# Patient Record
Sex: FEMALE | Race: OTHER | Employment: UNEMPLOYED | URBAN - METROPOLITAN AREA
[De-identification: names, ages, dates, MRNs, and addresses within clinical notes are randomized per-mention and may not be internally consistent; named-entity substitution may affect disease eponyms.]

---

## 2019-06-16 ENCOUNTER — HOSPITAL ENCOUNTER (INPATIENT)
Age: 74
LOS: 3 days | Discharge: HOME | DRG: 871 | End: 2019-06-19
Attending: EMERGENCY MEDICINE | Admitting: INTERNAL MEDICINE
Payer: SELF-PAY

## 2019-06-16 ENCOUNTER — APPOINTMENT (OUTPATIENT)
Dept: NON INVASIVE DIAGNOSTICS | Age: 74
DRG: 871 | End: 2019-06-16
Attending: INTERNAL MEDICINE
Payer: SELF-PAY

## 2019-06-16 ENCOUNTER — APPOINTMENT (OUTPATIENT)
Dept: GENERAL RADIOLOGY | Age: 74
DRG: 871 | End: 2019-06-16
Attending: EMERGENCY MEDICINE
Payer: SELF-PAY

## 2019-06-16 ENCOUNTER — APPOINTMENT (OUTPATIENT)
Dept: CT IMAGING | Age: 74
DRG: 871 | End: 2019-06-16
Attending: EMERGENCY MEDICINE
Payer: SELF-PAY

## 2019-06-16 DIAGNOSIS — R00.0 TACHYCARDIA: ICD-10-CM

## 2019-06-16 DIAGNOSIS — R06.02 SOB (SHORTNESS OF BREATH): Primary | ICD-10-CM

## 2019-06-16 DIAGNOSIS — R22.1 MASS OF RIGHT SIDE OF NECK: ICD-10-CM

## 2019-06-16 DIAGNOSIS — J18.9 PNEUMONIA OF BOTH UPPER LOBES DUE TO INFECTIOUS ORGANISM: ICD-10-CM

## 2019-06-16 PROBLEM — I10 HTN (HYPERTENSION): Status: ACTIVE | Noted: 2019-06-16

## 2019-06-16 PROBLEM — E87.6 HYPOKALEMIA: Status: ACTIVE | Noted: 2019-06-16

## 2019-06-16 PROBLEM — A41.9 SEPSIS (HCC): Status: ACTIVE | Noted: 2019-06-16

## 2019-06-16 PROBLEM — R93.89 ABNORMAL CHEST X-RAY: Status: ACTIVE | Noted: 2019-06-16

## 2019-06-16 PROBLEM — D72.829 LEUKOCYTOSIS: Status: ACTIVE | Noted: 2019-06-16

## 2019-06-16 LAB
ALBUMIN SERPL-MCNC: 4.4 G/DL (ref 3.5–5)
ALBUMIN/GLOB SERPL: 1 {RATIO} (ref 1.1–2.2)
ALP SERPL-CCNC: 145 U/L (ref 45–117)
ALT SERPL-CCNC: 15 U/L (ref 12–78)
ANION GAP SERPL CALC-SCNC: 7 MMOL/L (ref 5–15)
APPEARANCE UR: CLEAR
AST SERPL-CCNC: 17 U/L (ref 15–37)
ATRIAL RATE: 128 BPM
B PERT DNA SPEC QL NAA+PROBE: NOT DETECTED
BACTERIA URNS QL MICRO: NEGATIVE /HPF
BASOPHILS # BLD: 0 K/UL (ref 0–0.1)
BASOPHILS NFR BLD: 0 % (ref 0–1)
BILIRUB SERPL-MCNC: 0.6 MG/DL (ref 0.2–1)
BILIRUB UR QL: NEGATIVE
BNP SERPL-MCNC: 931 PG/ML
BUN SERPL-MCNC: 13 MG/DL (ref 6–20)
BUN/CREAT SERPL: 19 (ref 12–20)
C PNEUM DNA SPEC QL NAA+PROBE: NOT DETECTED
CALCIUM SERPL-MCNC: 8.8 MG/DL (ref 8.5–10.1)
CALCULATED P AXIS, ECG09: 80 DEGREES
CALCULATED R AXIS, ECG10: 82 DEGREES
CALCULATED T AXIS, ECG11: 8 DEGREES
CHLORIDE SERPL-SCNC: 104 MMOL/L (ref 97–108)
CO2 SERPL-SCNC: 27 MMOL/L (ref 21–32)
COLOR UR: ABNORMAL
COMMENT, HOLDF: NORMAL
CREAT SERPL-MCNC: 0.7 MG/DL (ref 0.55–1.02)
DIAGNOSIS, 93000: NORMAL
DIFFERENTIAL METHOD BLD: ABNORMAL
ECHO AO ASC DIAM: 2.56 CM
ECHO AO ROOT DIAM: 2.73 CM
ECHO IVC SNIFF: 2.05 CM
ECHO LA AREA 4C: 21.5 CM2
ECHO LA MAJOR AXIS: 3.66 CM
ECHO LA TO AORTIC ROOT RATIO: 1.34
ECHO LA VOL 2C: 69.97 ML (ref 22–52)
ECHO LA VOL 4C: 60.58 ML (ref 22–52)
ECHO LA VOL BP: 67.6 ML (ref 22–52)
ECHO LA VOL/BSA BIPLANE: 50.76 ML/M2 (ref 16–28)
ECHO LA VOLUME INDEX A2C: 52.54 ML/M2 (ref 16–28)
ECHO LA VOLUME INDEX A4C: 45.49 ML/M2 (ref 16–28)
ECHO LV E' LATERAL VELOCITY: 7.27 CENTIMETER/SECOND
ECHO LV E' SEPTAL VELOCITY: 7.54 CENTIMETER/SECOND
ECHO LV INTERNAL DIMENSION DIASTOLIC: 3.85 CM (ref 3.9–5.3)
ECHO LV INTERNAL DIMENSION SYSTOLIC: 2.7 CM
ECHO LV IVSD: 0.91 CM (ref 0.6–0.9)
ECHO LV MASS 2D: 102.1 G (ref 67–162)
ECHO LV MASS INDEX 2D: 76.7 G/M2 (ref 43–95)
ECHO LV POSTERIOR WALL DIASTOLIC: 0.76 CM (ref 0.6–0.9)
ECHO LVOT DIAM: 2.01 CM
ECHO MV A VELOCITY: 54.67 CM/S
ECHO MV AREA PHT: 7.4 CM2
ECHO MV E DECELERATION TIME (DT): 102.8 MS
ECHO MV E VELOCITY: 82.42 CM/S
ECHO MV E/A RATIO: 1.51
ECHO MV PRESSURE HALF TIME (PHT): 29.8 MS
ECHO PULMONARY ARTERY SYSTOLIC PRESSURE (PASP): 30 MMHG
ECHO RA AREA 4C: 14.02 CM2
ECHO RV INTERNAL DIMENSION: 3.22 CM
ECHO RV TAPSE: 1.76 CM (ref 1.5–2)
EOSINOPHIL # BLD: 0.3 K/UL (ref 0–0.4)
EOSINOPHIL NFR BLD: 2 % (ref 0–7)
EPITH CASTS URNS QL MICRO: ABNORMAL /LPF
ERYTHROCYTE [DISTWIDTH] IN BLOOD BY AUTOMATED COUNT: 13.8 % (ref 11.5–14.5)
FLUAV H1 2009 PAND RNA SPEC QL NAA+PROBE: NOT DETECTED
FLUAV H1 RNA SPEC QL NAA+PROBE: NOT DETECTED
FLUAV H3 RNA SPEC QL NAA+PROBE: NOT DETECTED
FLUAV SUBTYP SPEC NAA+PROBE: NOT DETECTED
FLUBV RNA SPEC QL NAA+PROBE: NOT DETECTED
GLOBULIN SER CALC-MCNC: 4.2 G/DL (ref 2–4)
GLUCOSE SERPL-MCNC: 120 MG/DL (ref 65–100)
GLUCOSE UR STRIP.AUTO-MCNC: NEGATIVE MG/DL
HADV DNA SPEC QL NAA+PROBE: NOT DETECTED
HCOV 229E RNA SPEC QL NAA+PROBE: NOT DETECTED
HCOV HKU1 RNA SPEC QL NAA+PROBE: NOT DETECTED
HCOV NL63 RNA SPEC QL NAA+PROBE: NOT DETECTED
HCOV OC43 RNA SPEC QL NAA+PROBE: NOT DETECTED
HCT VFR BLD AUTO: 43.2 % (ref 35–47)
HGB BLD-MCNC: 14.1 G/DL (ref 11.5–16)
HGB UR QL STRIP: ABNORMAL
HMPV RNA SPEC QL NAA+PROBE: NOT DETECTED
HPIV1 RNA SPEC QL NAA+PROBE: NOT DETECTED
HPIV2 RNA SPEC QL NAA+PROBE: NOT DETECTED
HPIV3 RNA SPEC QL NAA+PROBE: NOT DETECTED
HPIV4 RNA SPEC QL NAA+PROBE: NOT DETECTED
IMM GRANULOCYTES # BLD AUTO: 0.1 K/UL (ref 0–0.04)
IMM GRANULOCYTES NFR BLD AUTO: 0 % (ref 0–0.5)
KETONES UR QL STRIP.AUTO: NEGATIVE MG/DL
LACTATE BLD-SCNC: 0.91 MMOL/L (ref 0.4–2)
LEUKOCYTE ESTERASE UR QL STRIP.AUTO: ABNORMAL
LYMPHOCYTES # BLD: 2 K/UL (ref 0.8–3.5)
LYMPHOCYTES NFR BLD: 17 % (ref 12–49)
M PNEUMO DNA SPEC QL NAA+PROBE: NOT DETECTED
MAGNESIUM SERPL-MCNC: 2.5 MG/DL (ref 1.6–2.4)
MCH RBC QN AUTO: 28.7 PG (ref 26–34)
MCHC RBC AUTO-ENTMCNC: 32.6 G/DL (ref 30–36.5)
MCV RBC AUTO: 88 FL (ref 80–99)
MONOCYTES # BLD: 0.8 K/UL (ref 0–1)
MONOCYTES NFR BLD: 7 % (ref 5–13)
NEUTS SEG # BLD: 9 K/UL (ref 1.8–8)
NEUTS SEG NFR BLD: 74 % (ref 32–75)
NITRITE UR QL STRIP.AUTO: NEGATIVE
NRBC # BLD: 0 K/UL (ref 0–0.01)
NRBC BLD-RTO: 0 PER 100 WBC
P-R INTERVAL, ECG05: 132 MS
PH UR STRIP: 7 [PH] (ref 5–8)
PLATELET # BLD AUTO: 188 K/UL (ref 150–400)
PMV BLD AUTO: 12 FL (ref 8.9–12.9)
POTASSIUM SERPL-SCNC: 3.2 MMOL/L (ref 3.5–5.1)
PROCALCITONIN SERPL-MCNC: <0.1 NG/ML
PROT SERPL-MCNC: 8.6 G/DL (ref 6.4–8.2)
PROT UR STRIP-MCNC: NEGATIVE MG/DL
Q-T INTERVAL, ECG07: 286 MS
QRS DURATION, ECG06: 66 MS
QTC CALCULATION (BEZET), ECG08: 417 MS
RBC # BLD AUTO: 4.91 M/UL (ref 3.8–5.2)
RBC #/AREA URNS HPF: ABNORMAL /HPF (ref 0–5)
RSV RNA SPEC QL NAA+PROBE: NOT DETECTED
RV+EV RNA SPEC QL NAA+PROBE: DETECTED
SAMPLES BEING HELD,HOLD: NORMAL
SODIUM SERPL-SCNC: 138 MMOL/L (ref 136–145)
SP GR UR REFRACTOMETRY: 1.01 (ref 1–1.03)
T4 FREE SERPL-MCNC: 1.2 NG/DL (ref 0.8–1.5)
TROPONIN I BLD-MCNC: <0.04 NG/ML (ref 0–0.08)
TROPONIN I BLD-MCNC: <0.04 NG/ML (ref 0–0.08)
TSH SERPL DL<=0.05 MIU/L-ACNC: 0.98 UIU/ML (ref 0.36–3.74)
UR CULT HOLD, URHOLD: NORMAL
UROBILINOGEN UR QL STRIP.AUTO: 0.2 EU/DL (ref 0.2–1)
VENTRICULAR RATE, ECG03: 128 BPM
WBC # BLD AUTO: 12.2 K/UL (ref 3.6–11)
WBC URNS QL MICRO: ABNORMAL /HPF (ref 0–4)

## 2019-06-16 PROCEDURE — 93306 TTE W/DOPPLER COMPLETE: CPT

## 2019-06-16 PROCEDURE — 96375 TX/PRO/DX INJ NEW DRUG ADDON: CPT

## 2019-06-16 PROCEDURE — 99285 EMERGENCY DEPT VISIT HI MDM: CPT

## 2019-06-16 PROCEDURE — 77030013140 HC MSK NEB VYRM -A

## 2019-06-16 PROCEDURE — 71046 X-RAY EXAM CHEST 2 VIEWS: CPT

## 2019-06-16 PROCEDURE — 80053 COMPREHEN METABOLIC PANEL: CPT

## 2019-06-16 PROCEDURE — 86580 TB INTRADERMAL TEST: CPT | Performed by: INTERNAL MEDICINE

## 2019-06-16 PROCEDURE — 74011250636 HC RX REV CODE- 250/636: Performed by: EMERGENCY MEDICINE

## 2019-06-16 PROCEDURE — 83605 ASSAY OF LACTIC ACID: CPT

## 2019-06-16 PROCEDURE — 84484 ASSAY OF TROPONIN QUANT: CPT

## 2019-06-16 PROCEDURE — 81001 URINALYSIS AUTO W/SCOPE: CPT

## 2019-06-16 PROCEDURE — 74011636320 HC RX REV CODE- 636/320: Performed by: RADIOLOGY

## 2019-06-16 PROCEDURE — 74011250637 HC RX REV CODE- 250/637: Performed by: INTERNAL MEDICINE

## 2019-06-16 PROCEDURE — 87449 NOS EACH ORGANISM AG IA: CPT

## 2019-06-16 PROCEDURE — 84439 ASSAY OF FREE THYROXINE: CPT

## 2019-06-16 PROCEDURE — 65270000029 HC RM PRIVATE

## 2019-06-16 PROCEDURE — 74011000250 HC RX REV CODE- 250

## 2019-06-16 PROCEDURE — 74011250636 HC RX REV CODE- 250/636: Performed by: INTERNAL MEDICINE

## 2019-06-16 PROCEDURE — 94762 N-INVAS EAR/PLS OXIMTRY CONT: CPT

## 2019-06-16 PROCEDURE — 83735 ASSAY OF MAGNESIUM: CPT

## 2019-06-16 PROCEDURE — 74011000302 HC RX REV CODE- 302: Performed by: INTERNAL MEDICINE

## 2019-06-16 PROCEDURE — 94760 N-INVAS EAR/PLS OXIMETRY 1: CPT

## 2019-06-16 PROCEDURE — 87633 RESP VIRUS 12-25 TARGETS: CPT

## 2019-06-16 PROCEDURE — 87040 BLOOD CULTURE FOR BACTERIA: CPT

## 2019-06-16 PROCEDURE — 85025 COMPLETE CBC W/AUTO DIFF WBC: CPT

## 2019-06-16 PROCEDURE — 93005 ELECTROCARDIOGRAM TRACING: CPT

## 2019-06-16 PROCEDURE — 87899 AGENT NOS ASSAY W/OPTIC: CPT

## 2019-06-16 PROCEDURE — 84145 PROCALCITONIN (PCT): CPT

## 2019-06-16 PROCEDURE — 96365 THER/PROPH/DIAG IV INF INIT: CPT

## 2019-06-16 PROCEDURE — 87116 MYCOBACTERIA CULTURE: CPT

## 2019-06-16 PROCEDURE — 36415 COLL VENOUS BLD VENIPUNCTURE: CPT

## 2019-06-16 PROCEDURE — 83880 ASSAY OF NATRIURETIC PEPTIDE: CPT

## 2019-06-16 PROCEDURE — 70491 CT SOFT TISSUE NECK W/DYE: CPT

## 2019-06-16 PROCEDURE — 74011000250 HC RX REV CODE- 250: Performed by: EMERGENCY MEDICINE

## 2019-06-16 PROCEDURE — 84443 ASSAY THYROID STIM HORMONE: CPT

## 2019-06-16 RX ORDER — SODIUM CHLORIDE 0.9 % (FLUSH) 0.9 %
5-40 SYRINGE (ML) INJECTION EVERY 8 HOURS
Status: DISCONTINUED | OUTPATIENT
Start: 2019-06-16 | End: 2019-06-19 | Stop reason: HOSPADM

## 2019-06-16 RX ORDER — DIPHENHYDRAMINE HCL 25 MG
25 CAPSULE ORAL
Status: DISCONTINUED | OUTPATIENT
Start: 2019-06-16 | End: 2019-06-19 | Stop reason: HOSPADM

## 2019-06-16 RX ORDER — SODIUM CHLORIDE 0.9 % (FLUSH) 0.9 %
5-40 SYRINGE (ML) INJECTION AS NEEDED
Status: DISCONTINUED | OUTPATIENT
Start: 2019-06-16 | End: 2019-06-19 | Stop reason: HOSPADM

## 2019-06-16 RX ORDER — METOPROLOL TARTRATE 25 MG/1
25 TABLET, FILM COATED ORAL EVERY 12 HOURS
Status: DISCONTINUED | OUTPATIENT
Start: 2019-06-16 | End: 2019-06-19 | Stop reason: HOSPADM

## 2019-06-16 RX ORDER — POTASSIUM CHLORIDE 7.45 MG/ML
10 INJECTION INTRAVENOUS
Status: COMPLETED | OUTPATIENT
Start: 2019-06-16 | End: 2019-06-16

## 2019-06-16 RX ORDER — ENOXAPARIN SODIUM 100 MG/ML
30 INJECTION SUBCUTANEOUS EVERY 24 HOURS
Status: DISCONTINUED | OUTPATIENT
Start: 2019-06-16 | End: 2019-06-19 | Stop reason: HOSPADM

## 2019-06-16 RX ORDER — ONDANSETRON 2 MG/ML
4 INJECTION INTRAMUSCULAR; INTRAVENOUS
Status: DISCONTINUED | OUTPATIENT
Start: 2019-06-16 | End: 2019-06-19 | Stop reason: HOSPADM

## 2019-06-16 RX ORDER — ZOLPIDEM TARTRATE 5 MG/1
5 TABLET ORAL
Status: DISCONTINUED | OUTPATIENT
Start: 2019-06-16 | End: 2019-06-19 | Stop reason: HOSPADM

## 2019-06-16 RX ORDER — IPRATROPIUM BROMIDE AND ALBUTEROL SULFATE 2.5; .5 MG/3ML; MG/3ML
SOLUTION RESPIRATORY (INHALATION)
Status: COMPLETED
Start: 2019-06-16 | End: 2019-06-16

## 2019-06-16 RX ORDER — ACETAMINOPHEN 325 MG/1
650 TABLET ORAL
Status: DISCONTINUED | OUTPATIENT
Start: 2019-06-16 | End: 2019-06-19 | Stop reason: HOSPADM

## 2019-06-16 RX ADMIN — Medication 10 ML: at 13:28

## 2019-06-16 RX ADMIN — Medication 10 ML: at 10:18

## 2019-06-16 RX ADMIN — WATER 2 G: 1 INJECTION INTRAMUSCULAR; INTRAVENOUS; SUBCUTANEOUS at 06:50

## 2019-06-16 RX ADMIN — ACETAMINOPHEN 650 MG: 325 TABLET ORAL at 10:56

## 2019-06-16 RX ADMIN — IOPAMIDOL 100 ML: 612 INJECTION, SOLUTION INTRAVENOUS at 07:15

## 2019-06-16 RX ADMIN — TUBERCULIN PURIFIED PROTEIN DERIVATIVE 5 UNITS: 5 INJECTION, SOLUTION INTRADERMAL at 08:49

## 2019-06-16 RX ADMIN — POTASSIUM CHLORIDE 10 MEQ: 10 INJECTION, SOLUTION INTRAVENOUS at 10:57

## 2019-06-16 RX ADMIN — AZITHROMYCIN MONOHYDRATE 500 MG: 500 INJECTION, POWDER, LYOPHILIZED, FOR SOLUTION INTRAVENOUS at 06:52

## 2019-06-16 RX ADMIN — METOPROLOL TARTRATE 25 MG: 25 TABLET ORAL at 09:19

## 2019-06-16 RX ADMIN — ENOXAPARIN SODIUM 30 MG: 30 INJECTION SUBCUTANEOUS at 13:28

## 2019-06-16 RX ADMIN — Medication 5 ML: at 06:54

## 2019-06-16 RX ADMIN — IPRATROPIUM BROMIDE AND ALBUTEROL SULFATE 3 ML: .5; 3 SOLUTION RESPIRATORY (INHALATION) at 06:44

## 2019-06-16 RX ADMIN — ACETAMINOPHEN 650 MG: 325 TABLET ORAL at 17:47

## 2019-06-16 RX ADMIN — ALBUTEROL SULFATE 1 DOSE: 2.5 SOLUTION RESPIRATORY (INHALATION) at 06:48

## 2019-06-16 RX ADMIN — POTASSIUM CHLORIDE 10 MEQ: 10 INJECTION, SOLUTION INTRAVENOUS at 13:27

## 2019-06-16 RX ADMIN — Medication 10 ML: at 20:23

## 2019-06-16 RX ADMIN — SODIUM CHLORIDE 1000 ML: 900 INJECTION, SOLUTION INTRAVENOUS at 06:53

## 2019-06-16 RX ADMIN — POTASSIUM CHLORIDE 10 MEQ: 10 INJECTION, SOLUTION INTRAVENOUS at 11:47

## 2019-06-16 RX ADMIN — METOPROLOL TARTRATE 25 MG: 25 TABLET ORAL at 20:17

## 2019-06-16 RX ADMIN — POTASSIUM CHLORIDE 10 MEQ: 10 INJECTION, SOLUTION INTRAVENOUS at 09:19

## 2019-06-16 NOTE — H&P
[facility]    Hospitalist Admission Note      NAME:  Richad Riedel   :   1945   MRN:  [id number]     PCP:  None     Date/Time:  2019 8:48 AM          Subjective:     CHIEF COMPLAINT: cough     HISTORY OF PRESENT ILLNESS:     Ms. Belle Coughlin is a 72 y.o.  female who presented to the Emergency Department complaining of cough. Children in room to translate and provide hx Present for 2 days, with fever at home yesterday. No sick contacts. ER finds SIRS criteria and abnormal chest xray. She has a large chronic neck mass of unclear etiology. We will admit her for management. Past Medical History:   Diagnosis Date    HTN (hypertension)     Neck mass         No past surgical history on file. Social History     Tobacco Use    Smoking status: Not on file   Substance Use Topics    Alcohol use: Not on file        No family history on file.    Family hx cannot be fully assessed, due to her lack of knowledge of parents Dx    No Known Allergies     Prior to Admission medications    Not on File       Review of Systems:  (bold if positive, if negative)    Gen:  feverEyes:  ENT:  CVS:  Pulm: GI:  :  MS:  Skin:  Psych:  Endo:  Hem:  Renal:  Neuro:        Objective:      VITALS:    Vital signs reviewed; most recent are:    Visit Vitals  /86 (BP 1 Location: Right arm, BP Patient Position: Sitting)   Pulse (!) 120   Temp 98.8 °F (37.1 °C)   Resp 20   SpO2 99%     SpO2 Readings from Last 6 Encounters:   19 99%        No intake or output data in the 24 hours ending 19 0848     Exam:     Physical Exam:    Gen:  Frail, in no acute distress  HEENT:  Pink conjunctivae, PERRL, hearing intact to voice, moist mucous membranes  Neck:  Supple, Large R mass, thyroid non-tender  Resp:  No accessory muscle use, clear breath sounds without wheezes rales or rhonchi  Card:  No murmurs, normal S1, S2 without thrills, bruits or peripheral edema  Abd:  Soft, non-tender, non-distended, normoactive bowel sounds are present, no mass  Lymph:  No cervical or inguinal adenopathy  Musc:  No cyanosis or clubbing  Skin:  No rashes or ulcers, skin turgor is good  Neuro:  Cranial nerves are grossly intact, mild motor weakness, follows commands   Psych:  Good insight, oriented to person, place and time, alert     Labs:    Recent Labs     06/16/19 0558   WBC 12.2*   HGB 14.1   HCT 43.2        Recent Labs     06/16/19 0558      K 3.2*      CO2 27   *   BUN 13   CREA 0.70   CA 8.8   MG 2.5*   ALB 4.4   TBILI 0.6   SGOT 17   ALT 15     No results found for: GLUCPOC  No results for input(s): PH, PCO2, PO2, HCO3, FIO2 in the last 72 hours. No results for input(s): INR in the last 72 hours. No lab exists for component: INREXT  All Micro Results     Procedure Component Value Units Date/Time    RESPIRATORY PANEL,PCR,NASOPHARYNGEAL [[id number]]     Order Status:  Sent Specimen:  NASOPHARYNGEAL SWAB     LEGIONELLA PNEUMOPHILA AG, URINE [[id number]]     Order Status:  Sent Specimen:  Urine     S. Johann Charles, UR/CSF [[id number]]     Order Status:  Sent Specimen:  Other     URINE CULTURE HOLD SAMPLE [[id number]] Collected:  06/16/19 0634    Order Status:  Completed Specimen:  Urine from Serum Updated:  06/16/19 0653     Urine culture hold       URINE ON HOLD IN MICROBIOLOGY DEPT FOR 3 DAYS. IF UNPRESERVED URINE IS SUBMITTED, IT CANNOT BE USED FOR ADDITIONAL TESTING AFTER 24 HRS, RECOLLECTION WILL BE REQUIRED. CULTURE, BLOOD [[id number]] Collected:  06/16/19 0558    Order Status:  Completed Specimen:  Blood Updated:  06/16/19 0617          I have reviewed previous records       Assessment and Plan:      Pneumonia / Cough / Abnormal chest x-ray - POA, unclear etiology. Hx of living in [facility].  Check PPD, viral panel, pneumonia serologies. ER gave Ceftriaxone and Azithromycin. Consult pulm. Abx directions after workup.   May need Chest CT, with bilateral apical scaring noted on neck CT. Non smoker      Sepsis / Leukocytosis / Fever / Sinus tachycardia - POA, due to PNA vs other. NOT severe sepsis. Supportive care. Follow Cx. Neck mass - Present for years, unclear etiology. Never had bx. Consult ENT. They may need to do upper scope to see trachea    Hypokalemia - Replete    HTN (hypertension) - Per patient. Not on Meds. Check ECHO. Start metoprolol. Telemetry reviewed:   normal sinus rhythm    Risk of deterioration: high      Total time spent with patient: 79 Minutes I reviewed chart, notes, data and current medications in the medical record. I have examined and treated the patient at bedside during this period.                  Care Plan discussed with: Patient, Family, Nursing Staff, Consultant/Specialist and >50% of time spent in counseling and coordination of care    Discussed:  Care Plan       ___________________________________________________    Attending Physician: Ann Guillory MD

## 2019-06-16 NOTE — PROGRESS NOTES
[facility] Dosing Services: 6/16/2019    Enoxaparin was automatically dose-adjusted per [facility] P&T Committee Protocol, with respect to patient's actual body weight. Pt Weight:   Wt Readings from Last 1 Encounters:   06/16/19 42.5 kg (93 lb 9.6 oz)     Assessment/Plan: Actual body weight 42.5 kg. CrCl 49 mL/min. Enoxaparin changed to 30 mg SC every 24 hours per P&T approved protocol for female patient with actual body weight 35-45 kg. New Regimen Enoxaparin 30 mg SC every 24 hours   Previous Regimen Enoxaparin 40 mg SC every 24 hours   Serum Creatinine Lab Results   Component Value Date/Time    Creatinine 0.70 06/16/2019 05:58 AM         Creatinine Clearance Estimated Creatinine Clearance: 49.3 mL/min (based on SCr of 0.7 mg/dL).    BUN Lab Results   Component Value Date/Time    BUN 13 06/16/2019 05:58 AM         Dann Iniguez, Pharmacist

## 2019-06-16 NOTE — ED TRIAGE NOTES
Patient arrives for complaints of shortness of breath and chest pain     Patient has swelling to her neck that has been present for about 3 years    Patient having difficulty swallowing secretions     Dr. Shelly Looney at bedside for initial eval

## 2019-06-16 NOTE — ED NOTES
Care assumed from Dr. Joellen Leong at 0700. 72 y.o. female who recently immigrated from [facility] about 1 month prior presents with several year history of right sided neck mass that has been gradually enlarging. She presents with increased SOB and cough, no fever. Labs show WBC 12. normal hg and lactate and trop. Trace elevated BNP. Alk phos 145. Normal TSH. UA neg. CXR shows Bilateral upper lobe airspace disease may represent chronic  scarring, although superimposed pneumonia is not excluded:    CT neck with contrast shows large mass with mass effect but not narrowing of the airway. Hospitalist Alec for Admission  8:02 AM    ED Room Number: CV04/36  Patient Name and age: Orlin Paniagua 72 y.o.  female  Working Diagnosis:   1. SOB (shortness of breath)    2. Pneumonia of both upper lobes due to infectious organism ([address]    3. Mass of right side of neck    4. Tachycardia      Readmission: no  Isolation Requirements:  yes possible TB  Recommended Level of Care:  telemetry  Code Status:  full  Other:  Long history of neck mass has opted against surgery in the past due to risk associated, now with cough and SOB since Thursday. CXR shows likely PNA, CT neck shows mass but no impingement in the airway. Tachy with O2 requirement. Dr. Jadon Salas agrees to admit for further care.

## 2019-06-16 NOTE — ED NOTES
PPD test placed to right forearm- midline, mid forearm. This circled in pen at this time. Instructed patient and family regarding need for this testing, they verbalize understanding.

## 2019-06-16 NOTE — ROUTINE PROCESS
TRANSFER - OUT REPORT: 
 
Verbal report given to Shelly RN(name) on Jenn Sen  being transferred to 515(unit) for routine progression of care Report consisted of patients Situation, Background, Assessment and  
Recommendations(SBAR). Information from the following report(s) ED Summary was reviewed with the receiving nurse. Lines:  
Peripheral IV 06/16/19 Left Antecubital (Active) Site Assessment Clean, dry, & intact 6/16/2019  6:01 AM  
Phlebitis Assessment 0 6/16/2019  6:01 AM  
Dressing Status New 6/16/2019  6:01 AM  
Hub Color/Line Status Green 6/16/2019  6:01 AM  
  
 
Opportunity for questions and clarification was provided. Patient transported with: 
 Monitor Registered Nurse

## 2019-06-16 NOTE — ED NOTES
Patient's family calling out that patient cant breathe    Dr Diana Single at bedside to assess patient, orders placed for breathing treatment

## 2019-06-16 NOTE — PROGRESS NOTES
TRANSFER - IN REPORT:    Verbal report received from Celine(name) on Arlyss Priscilla  being received from ED(unit) for routine progression of care      Report consisted of patients Situation, Background, Assessment and   Recommendations(SBAR). Information from the following report(s) SBAR, Kardex, [facility] and Recent Results was reviewed with the receiving nurse. Opportunity for questions and clarification was provided. Assessment completed upon patients arrival to unit and care assumed.

## 2019-06-16 NOTE — PROGRESS NOTES
6/16/2019  3:02 PM    Pt's nurse advised pt's grandson Tone Collins can be contacted at [phone], this is the number CM previously called with no answer. Melina Angelucci, MSW      2:16 PM  CM consult received requesting PCP list, went to visit pt, unable to visit due to precautions, CM called contact number [phone] twice, no answer. CM spoke with pt's nurse and provided PCP list for nurse to provide to pt's family, asked nurse to get contact phone number for a pt's family member to provide to CM to complete an assessment.   Melina Angelucci, MSW

## 2019-06-16 NOTE — CONSULTS
[facility]     Name: Mandi Burton MRN: [id number]   : 1945 Hospital: [address]   Date: 2019        Impression Plan   1. Acute respiratory failure   2. COugh  3. Shortness of breath  4. Cavitary lesion ALEXIS  5. Neck mass               · Continue with azithro/ceftriaxone  · Pt on droplet precautions  · Bronch tomorrow  · RVP  · PNA panel  · AFB sputum cx x3  · ENT consult for neck mass  · NPO at midnight            Radiology  ( personally reviewed) CT neck- left and right upper. Left cavitary lesion- no prior imaging   ABG No results for input(s): PHI, PO2I, PCO2I in the last 72 hours. Subjective     Cc: cough    73 yo with PMHX of HTN presenting with one week of cough productive of clear sputum with some blood specks. Per son, pt has had a cough off and on since arriving to the [facility] April of this year. Pt does not speak English, nor does she seem very interested in the conversations going on around her. Vigorously eating food. Son denies any wt loss/fevers/chills. He does know of any TB exposures, but can't speak with any certainty on this. CT neck showed a 7x6 cm right neck mass along with biapical scarring and a ALEXIS cavitary lesions. Review of Systems:  A comprehensive review of systems was negative except for that written in the HPI. Past Medical History:   Diagnosis Date    HTN (hypertension)     Neck mass       No past surgical history on file.    Prior to Admission medications    Not on File     Current Facility-Administered Medications   Medication Dose Route Frequency    sodium chloride (NS) flush 5-40 mL  5-40 mL IntraVENous Q8H    azithromycin (ZITHROMAX) 500 mg in 0.9% sodium chloride (MBP/ADV) 250 mL  500 mg IntraVENous Q24H    [START ON 2019] cefTRIAXone (ROCEPHIN) 2 g in 0.9% sodium chloride (MBP/ADV) 50 mL  2 g IntraVENous Q24H    metoprolol tartrate (LOPRESSOR) tablet 25 mg  25 mg Oral Q12H    sodium chloride (NS) flush 5-40 mL  5-40 mL IntraVENous Q8H    enoxaparin (LOVENOX) injection 40 mg  40 mg SubCUTAneous Q24H    tuberculin injection 5 Units  5 Units IntraDERMal ONCE    [START ON 6/17/2019] Tuberculin (Mantoux) Test: Read at 24 hrs and 48 hrs post placement. Thank you! 1 Each Other Q24H    potassium chloride 10 mEq in 100 ml IVPB  10 mEq IntraVENous Q1H     No Known Allergies   Social History     Tobacco Use    Smoking status: Not on file   Substance Use Topics    Alcohol use: Not on file      No family history on file. Laboratory: I have personally reviewed the critical care flowsheet and labs.      Recent Labs     06/16/19  0558   WBC 12.2*   HGB 14.1   HCT 43.2        Recent Labs     06/16/19  0558      K 3.2*      CO2 27   *   BUN 13   CREA 0.70   CA 8.8   MG 2.5*   ALB 4.4   SGOT 17   ALT 15       Objective:     Mode Rate Tidal Volume Pressure FiO2 PEEP                    Vital Signs:     TMAX(24)      Intake/Output:   Last shift:         Last 3 shifts: 06/16 0701 - 06/16 1900  In: 1250 [I.V.:1250]  Out: - RRIOLAST3    Intake/Output Summary (Last 24 hours) at 6/16/2019 0947  Last data filed at 6/16/2019 0753  Gross per 24 hour   Intake 1250 ml   Output    Net 1250 ml     EXAM:   GENERAL: well developed and in no distress, HEENT:  PERRL, EOMI, no alar flaring or epistaxis, oral mucosa moist without cyanosis, NECK:  no jugular vein distention, large, firm right sided neck mass LUNGS: CTA, no w/r/r, HEART:  Regular rate and rhythm with no MGR; no edema is present, ABDOMEN:  soft with no tenderness, bowel sounds present, EXTREMITIES:  warm with no cyanosis, SKIN:  no jaundice or ecchymosis and NEUROLOGIC:  alert and oriented, grossly non-focal    Leena Schultz MD  [facility]

## 2019-06-16 NOTE — PROGRESS NOTES
[facility]: MED RECONCILIATION    Comments/Recommendations:   Med rec performed by Cheryl Alan RN. Patient denies any medications which is also consistent with MD notes.        Cindy Noriega, PHARMD   Contact: [phone]

## 2019-06-16 NOTE — PROGRESS NOTES
Bedside shift change report given to Zoraida Waldrop (oncoming nurse) by Marleny Shafer (offgoing nurse). Report included the following information SBAR, Kardex, MAR and Recent Results.

## 2019-06-16 NOTE — ED PROVIDER NOTES
This is a 68-year-old female who comes emergency room with chief complaint of shortness of breath. Patient's family state that she's been having shortness of breath the past couple of days. Patient has had a productive cough. Patient and family were state that her symptoms have continued to worsen. Patient has a history of a right-sided neck mass which is been present for for 5 years. Patient denies any fever. The patient denies any abdominal pain. Patient did have some chest pain associated with her shortness of breath since last night. Patient states that they wanted to operate on her neck mass, however, the patient refused to to the risks of the surgery. Patient is unsure exactly what the neck mass is. Patient recently relocated in the [place] from [facility] about one month ago. The history is provided by the patient and a relative. The history is limited by a language barrier. A  was used (family member). Shortness of Breath   This is a new problem. The current episode started 2 days ago. The problem has been gradually worsening. Associated symptoms include swollen glands, cough and sputum production. Pertinent negatives include no fever, no rhinorrhea, no ear pain, no hemoptysis, no chest pain, no vomiting, no abdominal pain, no rash, no leg pain and no leg swelling. It is unknown what precipitated the problem. She has tried nothing for the symptoms. Associated medical issues do not include asthma, COPD, pneumonia, CAD, heart failure or DVT. No past medical history on file. No past surgical history on file. No family history on file.     Social History     Socioeconomic History    Marital status:      Spouse name: Not on file    Number of children: Not on file    Years of education: Not on file    Highest education level: Not on file   Occupational History    Not on file   Social Needs    Financial resource strain: Not on file    Food insecurity: Worry: Not on file     Inability: Not on file    Transportation needs:     Medical: Not on file     Non-medical: Not on file   Tobacco Use    Smoking status: Not on file   Substance and Sexual Activity    Alcohol use: Not on file    Drug use: Not on file    Sexual activity: Not on file   Lifestyle    Physical activity:     Days per week: Not on file     Minutes per session: Not on file    Stress: Not on file   Relationships    Social connections:     Talks on phone: Not on file     Gets together: Not on file     Attends [employer] service: Not on file     Active member of club or organization: Not on file     Attends meetings of clubs or organizations: Not on file     Relationship status: Not on file    Intimate partner violence:     Fear of current or ex partner: Not on file     Emotionally abused: Not on file     Physically abused: Not on file     Forced sexual activity: Not on file   Other Topics Concern    Not on file   Social History Narrative    Not on file         ALLERGIES: Patient has no known allergies. Review of Systems   Constitutional: Negative for appetite change, chills, fever and unexpected weight change. HENT: Negative for ear pain, hearing loss, rhinorrhea and trouble swallowing. Eyes: Negative for pain and visual disturbance. Respiratory: Positive for cough, sputum production and shortness of breath. Negative for hemoptysis and chest tightness. Cardiovascular: Negative for chest pain, palpitations and leg swelling. Gastrointestinal: Negative for abdominal distention, abdominal pain, blood in stool and vomiting. Genitourinary: Negative for dysuria, hematuria and urgency. Musculoskeletal: Negative for back pain and myalgias. Skin: Negative for rash. Neurological: Negative for dizziness, syncope, weakness and numbness. Psychiatric/Behavioral: Negative for confusion and suicidal ideas. All other systems reviewed and are negative.       Vitals:    06/16/19 0549   BP: 199/86   Pulse: (!) 120   Resp: 20   Temp: 98.8 °F (37.1 °C)   SpO2: 99%            Physical Exam   Constitutional: She is oriented to person, place, and time. She appears well-developed and well-nourished. No distress. HENT:   Head: Normocephalic and atraumatic. Right Ear: External ear normal.   Left Ear: External ear normal.   Nose: Nose normal.   Mouth/Throat: Oropharynx is clear and moist. No oropharyngeal exudate. Eyes: Pupils are equal, round, and reactive to light. Conjunctivae and EOM are normal. Right eye exhibits no discharge. Left eye exhibits no discharge. No scleral icterus. Neck: Normal range of motion. Neck supple. No JVD present. Tracheal deviation (leftward) present. Thyroid mass present. Cardiovascular: Regular rhythm, normal heart sounds and intact distal pulses. Tachycardia present. Exam reveals no gallop and no friction rub. No murmur heard. Pulmonary/Chest: Effort normal. No stridor. Tachypnea noted. No respiratory distress. She has decreased breath sounds in the right lower field and the left lower field. She has no wheezes. She has no rhonchi. She has no rales. She exhibits no tenderness. Abdominal: Soft. Bowel sounds are normal. She exhibits no distension. There is no tenderness. There is no rebound and no guarding. Musculoskeletal: Normal range of motion. She exhibits no edema or tenderness. Neurological: She is alert and oriented to person, place, and time. She has normal strength and normal reflexes. She displays normal reflexes. No cranial nerve deficit or sensory deficit. She exhibits normal muscle tone. Coordination normal. GCS eye subscore is 4. GCS verbal subscore is 5. GCS motor subscore is 6. Skin: Skin is warm and dry. Capillary refill takes less than 2 seconds. No rash noted. She is not diaphoretic. No erythema. No pallor. Psychiatric: She has a normal mood and affect.  Her behavior is normal. Judgment and thought content normal.   Nursing note and vitals reviewed. MDM  Number of Diagnoses or Management Options  Mass of right side of neck:   Pneumonia of both upper lobes due to infectious organism [facility]):   SOB (shortness of breath): Tachycardia:      Amount and/or Complexity of Data Reviewed  Clinical lab tests: ordered and reviewed  Tests in the radiology section of CPT®: ordered and reviewed  Tests in the medicine section of CPT®: ordered and reviewed  Independent visualization of images, tracings, or specimens: yes (ekg)    Risk of Complications, Morbidity, and/or Mortality  Presenting problems: high  Diagnostic procedures: moderate  Management options: moderate    Critical Care  Total time providing critical care: 30-74 minutes (Total critical care time spent exclusive of procedures:  40 minutes)    Patient Progress  Patient progress: stable         Procedures    Chief Complaint   Patient presents with    Shortness of Breath       The patient's presenting problems have been discussed, and they are in agreement with the care plan formulated and outlined with them. I have encouraged them to ask questions as they arise throughout their visit.     MEDICATIONS GIVEN:  Medications   sodium chloride (NS) flush 5-40 mL (5 mL IntraVENous Given 6/16/19 0654)   sodium chloride (NS) flush 5-40 mL (has no administration in time range)   sodium chloride 0.9 % bolus infusion 1,000 mL (1,000 mL IntraVENous New Bag 6/16/19 0653)   iopamidol (ISOVUE 300) 61 % contrast injection 100 mL (has no administration in time range)   azithromycin (ZITHROMAX) 500 mg in 0.9% sodium chloride (MBP/ADV) 250 mL (500 mg IntraVENous New Bag 6/16/19 0652)   cefTRIAXone (ROCEPHIN) 2 g in 0.9% sodium chloride (MBP/ADV) 50 mL (has no administration in time range)   cefTRIAXone (ROCEPHIN) 2 g in sterile water (preservative free) 20 mL IV syringe (2 g IntraVENous Given 6/16/19 0650)   albuterol 5mg / ipratropium 0.5mg neb solution (1 Dose Nebulization Given 6/16/19 0648) albuterol-ipratropium (DUO-NEB) 2.5 mg-0.5 mg/3 ml nebulizer solution (3 mL  Given 6/16/19 0644)       LABS REVIEWED:  Recent Results (from the past 24 hour(s))   EKG, 12 LEAD, INITIAL    Collection Time: 06/16/19  5:46 AM   Result Value Ref Range    Ventricular Rate 128 BPM    Atrial Rate 128 BPM    P-R Interval 132 ms    QRS Duration 66 ms    Q-T Interval 286 ms    QTC Calculation (Bezet) 417 ms    Calculated P Axis 80 degrees    Calculated R Axis 82 degrees    Calculated T Axis 8 degrees    Diagnosis       Sinus tachycardia  Right atrial enlargement  Nonspecific ST abnormality  Abnormal ECG  No previous ECGs available     CBC WITH AUTOMATED DIFF    Collection Time: 06/16/19  5:58 AM   Result Value Ref Range    WBC 12.2 (H) 3.6 - 11.0 K/uL    RBC 4.91 3.80 - 5.20 M/uL    HGB 14.1 11.5 - 16.0 g/dL    HCT 43.2 35.0 - 47.0 %    MCV 88.0 80.0 - 99.0 FL    MCH 28.7 26.0 - 34.0 PG    MCHC 32.6 30.0 - 36.5 g/dL    RDW 13.8 11.5 - 14.5 %    PLATELET 348 987 - 254 K/uL    MPV 12.0 8.9 - 12.9 FL    NRBC 0.0 0  WBC    ABSOLUTE NRBC 0.00 0.00 - 0.01 K/uL    NEUTROPHILS 74 32 - 75 %    LYMPHOCYTES 17 12 - 49 %    MONOCYTES 7 5 - 13 %    EOSINOPHILS 2 0 - 7 %    BASOPHILS 0 0 - 1 %    IMMATURE GRANULOCYTES 0 0.0 - 0.5 %    ABS. NEUTROPHILS 9.0 (H) 1.8 - 8.0 K/UL    ABS. LYMPHOCYTES 2.0 0.8 - 3.5 K/UL    ABS. MONOCYTES 0.8 0.0 - 1.0 K/UL    ABS. EOSINOPHILS 0.3 0.0 - 0.4 K/UL    ABS. BASOPHILS 0.0 0.0 - 0.1 K/UL    ABS. IMM.  GRANS. 0.1 (H) 0.00 - 0.04 K/UL    DF AUTOMATED     METABOLIC PANEL, COMPREHENSIVE    Collection Time: 06/16/19  5:58 AM   Result Value Ref Range    Sodium 138 136 - 145 mmol/L    Potassium 3.2 (L) 3.5 - 5.1 mmol/L    Chloride 104 97 - 108 mmol/L    CO2 27 21 - 32 mmol/L    Anion gap 7 5 - 15 mmol/L    Glucose 120 (H) 65 - 100 mg/dL    BUN 13 6 - 20 MG/DL    Creatinine 0.70 0.55 - 1.02 MG/DL    BUN/Creatinine ratio 19 12 - 20      GFR est AA >60 >60 ml/min/1.73m2    GFR est non-AA >60 >60 ml/min/1.73m2    Calcium 8.8 8.5 - 10.1 MG/DL    Bilirubin, total 0.6 0.2 - 1.0 MG/DL    ALT (SGPT) 15 12 - 78 U/L    AST (SGOT) 17 15 - 37 U/L    Alk. phosphatase 145 (H) 45 - 117 U/L    Protein, total 8.6 (H) 6.4 - 8.2 g/dL    Albumin 4.4 3.5 - 5.0 g/dL    Globulin 4.2 (H) 2.0 - 4.0 g/dL    A-G Ratio 1.0 (L) 1.1 - 2.2     SAMPLES BEING HELD    Collection Time: 06/16/19  5:58 AM   Result Value Ref Range    SAMPLES BEING HELD RD,BABITA,DRK GRN     COMMENT        Add-on orders for these samples will be processed based on acceptable specimen integrity and analyte stability, which may vary by analyte. MAGNESIUM    Collection Time: 06/16/19  5:58 AM   Result Value Ref Range    Magnesium 2.5 (H) 1.6 - 2.4 mg/dL   NT-PRO BNP    Collection Time: 06/16/19  5:58 AM   Result Value Ref Range    NT pro- (H) <125 PG/ML   POC TROPONIN-I    Collection Time: 06/16/19  5:58 AM   Result Value Ref Range    Troponin-I (POC) <0.04 0.00 - 0.08 ng/mL   POC LACTIC ACID    Collection Time: 06/16/19  6:00 AM   Result Value Ref Range    Lactic Acid (POC) 0.91 0.40 - 2.00 mmol/L   URINALYSIS W/MICROSCOPIC    Collection Time: 06/16/19  6:34 AM   Result Value Ref Range    Color YELLOW/STRAW      Appearance CLEAR CLEAR      Specific gravity 1.009 1.003 - 1.030      pH (UA) 7.0 5.0 - 8.0      Protein NEGATIVE  NEG mg/dL    Glucose NEGATIVE  NEG mg/dL    Ketone NEGATIVE  NEG mg/dL    Bilirubin NEGATIVE  NEG      Blood TRACE (A) NEG      Urobilinogen 0.2 0.2 - 1.0 EU/dL    Nitrites NEGATIVE  NEG      Leukocyte Esterase SMALL (A) NEG      WBC PENDING /hpf    RBC PENDING /hpf    Epithelial cells PENDING /lpf    Bacteria PENDING /hpf   URINE CULTURE HOLD SAMPLE    Collection Time: 06/16/19  6:34 AM   Result Value Ref Range    Urine culture hold        URINE ON HOLD IN MICROBIOLOGY DEPT FOR 3 DAYS. IF UNPRESERVED URINE IS SUBMITTED, IT CANNOT BE USED FOR ADDITIONAL TESTING AFTER 24 HRS, RECOLLECTION WILL BE REQUIRED.        VITAL SIGNS:  Patient Vitals for the past 12 hrs:   Temp Pulse Resp BP SpO2   06/16/19 0549 98.8 °F (37.1 °C) (!) 120 20 199/86 99 %       RADIOLOGY RESULTS:  The following have been ordered and reviewed:  Xr Chest Pa Lat    Result Date: 6/16/2019  INDICATION: Shortness of breath FINDINGS: PA and lateral views of the chest demonstrate a normal cardiomediastinal silhouette. There is bilateral upper lobe airspace disease. No pleural effusion is evident. The visualized osseous structures are unremarkable. IMPRESSION: Bilateral upper lobe airspace disease may represent chronic scarring, although superimposed pneumonia is not excluded: Radiographic follow-up is recommended to assure stability or resolution. ED EKG interpretation:  Rhythm: sinus tachycardia; and regular . Rate (approx.): 128; Axis: normal; P wave: normal; QRS interval: normal ; ST/T wave: non-specific changes; Other findings: abnormal ekg, right atrial enlargement. This EKG was interpreted by Ratna Morse DO, ED Provider. PROGRESS NOTES:  6:55 AM  Pt now having some wheezing. Duo neb given. Pt looking better. 7:06 AM  Change of shift. Care of patient signed over to Dr. Matt Chambers. Bedside handoff complete. DIAGNOSIS:    1. SOB (shortness of breath)    2. Pneumonia of both upper lobes due to infectious organism ([address]    3. Mass of right side of neck    4. Tachycardia        PLAN:  Probable admission after CT. ED COURSE: The patient's hospital course has been uncomplicated.

## 2019-06-17 LAB
ANION GAP SERPL CALC-SCNC: 3 MMOL/L (ref 5–15)
BUN SERPL-MCNC: 15 MG/DL (ref 6–20)
BUN/CREAT SERPL: 22 (ref 12–20)
CALCIUM SERPL-MCNC: 8.2 MG/DL (ref 8.5–10.1)
CHLORIDE SERPL-SCNC: 112 MMOL/L (ref 97–108)
CO2 SERPL-SCNC: 28 MMOL/L (ref 21–32)
CREAT SERPL-MCNC: 0.68 MG/DL (ref 0.55–1.02)
ERYTHROCYTE [DISTWIDTH] IN BLOOD BY AUTOMATED COUNT: 14.5 % (ref 11.5–14.5)
GLUCOSE SERPL-MCNC: 101 MG/DL (ref 65–100)
HCT VFR BLD AUTO: 36.7 % (ref 35–47)
HGB BLD-MCNC: 11.6 G/DL (ref 11.5–16)
MAGNESIUM SERPL-MCNC: 2.4 MG/DL (ref 1.6–2.4)
MCH RBC QN AUTO: 28.6 PG (ref 26–34)
MCHC RBC AUTO-ENTMCNC: 31.6 G/DL (ref 30–36.5)
MCV RBC AUTO: 90.6 FL (ref 80–99)
NRBC # BLD: 0 K/UL (ref 0–0.01)
NRBC BLD-RTO: 0 PER 100 WBC
PLATELET # BLD AUTO: 168 K/UL (ref 150–400)
PMV BLD AUTO: 12.8 FL (ref 8.9–12.9)
POTASSIUM SERPL-SCNC: 4 MMOL/L (ref 3.5–5.1)
RBC # BLD AUTO: 4.05 M/UL (ref 3.8–5.2)
SODIUM SERPL-SCNC: 143 MMOL/L (ref 136–145)
WBC # BLD AUTO: 8.6 K/UL (ref 3.6–11)

## 2019-06-17 PROCEDURE — 77030022556 HC FCPS BIOP TIS ENDOSC BSC -B: Performed by: INTERNAL MEDICINE

## 2019-06-17 PROCEDURE — 0CJS8ZZ INSPECTION OF LARYNX, VIA NATURAL OR ARTIFICIAL OPENING ENDOSCOPIC: ICD-10-PCS | Performed by: INTERNAL MEDICINE

## 2019-06-17 PROCEDURE — 76040000019: Performed by: INTERNAL MEDICINE

## 2019-06-17 PROCEDURE — 65270000029 HC RM PRIVATE

## 2019-06-17 PROCEDURE — 74011250636 HC RX REV CODE- 250/636: Performed by: INTERNAL MEDICINE

## 2019-06-17 PROCEDURE — 74011000258 HC RX REV CODE- 258: Performed by: INTERNAL MEDICINE

## 2019-06-17 PROCEDURE — 83735 ASSAY OF MAGNESIUM: CPT

## 2019-06-17 PROCEDURE — 36415 COLL VENOUS BLD VENIPUNCTURE: CPT

## 2019-06-17 PROCEDURE — 87116 MYCOBACTERIA CULTURE: CPT

## 2019-06-17 PROCEDURE — 74011250637 HC RX REV CODE- 250/637: Performed by: INTERNAL MEDICINE

## 2019-06-17 PROCEDURE — 80048 BASIC METABOLIC PNL TOTAL CA: CPT

## 2019-06-17 PROCEDURE — 74011000250 HC RX REV CODE- 250: Performed by: INTERNAL MEDICINE

## 2019-06-17 PROCEDURE — 94760 N-INVAS EAR/PLS OXIMETRY 1: CPT

## 2019-06-17 PROCEDURE — 85027 COMPLETE CBC AUTOMATED: CPT

## 2019-06-17 RX ORDER — NALOXONE HYDROCHLORIDE 0.4 MG/ML
0.1 INJECTION, SOLUTION INTRAMUSCULAR; INTRAVENOUS; SUBCUTANEOUS
Status: COMPLETED | OUTPATIENT
Start: 2019-06-17 | End: 2019-06-17

## 2019-06-17 RX ORDER — SODIUM CHLORIDE 0.9 % (FLUSH) 0.9 %
5-40 SYRINGE (ML) INJECTION AS NEEDED
Status: CANCELLED | OUTPATIENT
Start: 2019-06-17

## 2019-06-17 RX ORDER — FENTANYL CITRATE 50 UG/ML
25-50 INJECTION, SOLUTION INTRAMUSCULAR; INTRAVENOUS
Status: DISCONTINUED | OUTPATIENT
Start: 2019-06-17 | End: 2019-06-17 | Stop reason: HOSPADM

## 2019-06-17 RX ORDER — LIDOCAINE HYDROCHLORIDE 20 MG/ML
5 SOLUTION OROPHARYNGEAL ONCE
Status: DISCONTINUED | OUTPATIENT
Start: 2019-06-17 | End: 2019-06-17 | Stop reason: HOSPADM

## 2019-06-17 RX ORDER — LIDOCAINE HYDROCHLORIDE 20 MG/ML
20 INJECTION, SOLUTION INFILTRATION; PERINEURAL ONCE
Status: COMPLETED | OUTPATIENT
Start: 2019-06-17 | End: 2019-06-17

## 2019-06-17 RX ORDER — FLUMAZENIL 0.1 MG/ML
0.2 INJECTION INTRAVENOUS
Status: DISCONTINUED | OUTPATIENT
Start: 2019-06-17 | End: 2019-06-17 | Stop reason: HOSPADM

## 2019-06-17 RX ORDER — LIDOCAINE HYDROCHLORIDE 40 MG/ML
SOLUTION TOPICAL ONCE
Status: COMPLETED | OUTPATIENT
Start: 2019-06-17 | End: 2019-06-17

## 2019-06-17 RX ORDER — SODIUM CHLORIDE 0.9 % (FLUSH) 0.9 %
5-40 SYRINGE (ML) INJECTION EVERY 8 HOURS
Status: CANCELLED | OUTPATIENT
Start: 2019-06-17

## 2019-06-17 RX ORDER — MIDAZOLAM HYDROCHLORIDE 1 MG/ML
.25-5 INJECTION, SOLUTION INTRAMUSCULAR; INTRAVENOUS
Status: DISCONTINUED | OUTPATIENT
Start: 2019-06-17 | End: 2019-06-17 | Stop reason: HOSPADM

## 2019-06-17 RX ORDER — LIDOCAINE HYDROCHLORIDE 20 MG/ML
20 INJECTION, SOLUTION EPIDURAL; INFILTRATION; INTRACAUDAL; PERINEURAL ONCE
Status: DISCONTINUED | OUTPATIENT
Start: 2019-06-17 | End: 2019-06-17 | Stop reason: HOSPADM

## 2019-06-17 RX ORDER — SODIUM CHLORIDE 0.9 % (FLUSH) 0.9 %
5-40 SYRINGE (ML) INJECTION AS NEEDED
Status: DISCONTINUED | OUTPATIENT
Start: 2019-06-17 | End: 2019-06-19 | Stop reason: HOSPADM

## 2019-06-17 RX ORDER — SODIUM CHLORIDE 0.9 % (FLUSH) 0.9 %
5-40 SYRINGE (ML) INJECTION EVERY 8 HOURS
Status: DISCONTINUED | OUTPATIENT
Start: 2019-06-17 | End: 2019-06-19 | Stop reason: HOSPADM

## 2019-06-17 RX ADMIN — FENTANYL CITRATE 25 MCG: 50 INJECTION, SOLUTION INTRAMUSCULAR; INTRAVENOUS at 14:07

## 2019-06-17 RX ADMIN — MIDAZOLAM 0.5 MG: 1 INJECTION INTRAMUSCULAR; INTRAVENOUS at 14:04

## 2019-06-17 RX ADMIN — ONDANSETRON 4 MG: 2 INJECTION INTRAMUSCULAR; INTRAVENOUS at 15:22

## 2019-06-17 RX ADMIN — Medication 10 ML: at 22:00

## 2019-06-17 RX ADMIN — MIDAZOLAM 0.5 MG: 1 INJECTION INTRAMUSCULAR; INTRAVENOUS at 14:07

## 2019-06-17 RX ADMIN — CEFTRIAXONE 2 G: 2 INJECTION, POWDER, FOR SOLUTION INTRAMUSCULAR; INTRAVENOUS at 05:04

## 2019-06-17 RX ADMIN — FENTANYL CITRATE 25 MCG: 50 INJECTION, SOLUTION INTRAMUSCULAR; INTRAVENOUS at 14:04

## 2019-06-17 RX ADMIN — FENTANYL CITRATE 25 MCG: 50 INJECTION, SOLUTION INTRAMUSCULAR; INTRAVENOUS at 14:16

## 2019-06-17 RX ADMIN — METOPROLOL TARTRATE 25 MG: 25 TABLET ORAL at 21:45

## 2019-06-17 RX ADMIN — Medication 10 ML: at 21:46

## 2019-06-17 RX ADMIN — LIDOCAINE HYDROCHLORIDE 6 ML: 20 INJECTION, SOLUTION INFILTRATION; PERINEURAL at 14:28

## 2019-06-17 RX ADMIN — FENTANYL CITRATE 25 MCG: 50 INJECTION, SOLUTION INTRAMUSCULAR; INTRAVENOUS at 14:22

## 2019-06-17 RX ADMIN — Medication 10 ML: at 05:04

## 2019-06-17 RX ADMIN — METOPROLOL TARTRATE 25 MG: 25 TABLET ORAL at 09:45

## 2019-06-17 RX ADMIN — Medication 10 ML: at 13:09

## 2019-06-17 RX ADMIN — AZITHROMYCIN MONOHYDRATE 500 MG: 500 INJECTION, POWDER, LYOPHILIZED, FOR SOLUTION INTRAVENOUS at 06:11

## 2019-06-17 RX ADMIN — MIDAZOLAM 0.5 MG: 1 INJECTION INTRAMUSCULAR; INTRAVENOUS at 14:14

## 2019-06-17 RX ADMIN — NALOXONE HYDROCHLORIDE 0.4 MG: 0.4 INJECTION, SOLUTION INTRAMUSCULAR; INTRAVENOUS; SUBCUTANEOUS at 14:25

## 2019-06-17 RX ADMIN — LIDOCAINE HYDROCHLORIDE 10 ML: 40 SOLUTION TOPICAL at 13:41

## 2019-06-17 RX ADMIN — MIDAZOLAM 0.5 MG: 1 INJECTION INTRAMUSCULAR; INTRAVENOUS at 14:21

## 2019-06-17 NOTE — PROGRESS NOTES
0745- Bedside and Verbal shift change report given to [address] (oncoming nurse) by Giovanny Thomas (offgoing nurse). Report included the following information SBAR, Kardex, Intake/Output, MAR and Recent Results. Pt observed in bed, resting quietly, on room air, denies any pain at this time, family at bedside, will assess. 0800- Pharmacist consulted, orders regarding repeat PPD for today at 0849, pharm stated they do not believe pt requires second PPD. Will contact Dr. Donte Arita. 1480- Dr. Donte Arita on unit and updated, stated patient OK to receive AM PO meds and that patient does not require second PPD injection, just needs reading today. Stated that when patient returns from Bronch this afternoon, cardiac diet may be resumed. 46- This RN in patient room, 24 hr post PPD reading, there was approximately 45 mm by 40 mm of raised red tissue around PPD injection site. 1058- Call from Endo report given to MAGNOLIA, opportunities for questions provided. RN stated that patient scheduled for 2 pm today. 1310- Transport on unit to take patient to bronch. Patient placed mask on. Family members accompanying patient down. 1515- Call from PACU RN in PACU with recheck of PPD, stated induration approximately 1 inch. [phone]- Patient returned to unit, vs obtained, denies any pain, dual skin assessment of PPD with Latasha Headings. Induration 30 cm.    1931- Bedside and Verbal shift change report given to KIRAN Meek (oncoming nurse) by [facility] (offgoing nurse). Report included the following information SBAR, Kardex, Intake/Output, MAR and Recent Results.

## 2019-06-17 NOTE — CONSULTS
Consult received. Pt admitted for pneumonia. Will undergo bronch today. Incidentally has neck mass x 5 years. CT reviewed, shows 7cm, at least partially cystic, neck mass on right side, deviates trachea slightly. 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 Next step for neck mass workup would be FNA. Could do ultrasound guided FNA while she is admitted. Otherwise, this will be worked up as an outpatient. She can f/u after discharge.

## 2019-06-17 NOTE — PROCEDURES
[address]  [facility] [address]  [address] [address] [address]  [phone]  1945  [id number]      Date of Procedure: 6/17/2019    Preoperative diagnosis: abnormal CXR    Procedure: Procedure(s):  BRONCHOSCOPY    Indication: abnormal CT chest    :  Elsie Kim MD    Assistant(s): Endoscopy Technician-1: Gabriela Holly RN-1: Italo Antonio RN    Anesthesia/Sedation:  Versed 2 mg IV and Fentanyl 100 mcg IV      Procedure Details:  After infomed consent was obtained for the procedure, with all risks and benefits of procedure explained the patient was taken to the endoscopy suite and placed in the supine position. Following sequential administration of sedation as per above, the bronchoscope was inserted into the mouth and advanced under direct vision to the vocal cords. Crowding was noted within the orophynx and and true vocal cords with small opening, unable to pass the cords despite multiple attempts. She then began to hypoventilate and desatted to the 70's and required bagging and narcan. Sats quickly improved and she was more alert. Procedure aborted at this time.            Complications:  Patient did not tolerate the procedure, required reversal with narcan    EBL:  Minimal           Impression:    Abnormal CT, unsuccessful attempt at bronchoscopy with BAL      Teressa Ambriz MD  6/17/2019  2:28 PM

## 2019-06-17 NOTE — PROGRESS NOTES
[facility]     Name: Alison Moya MRN: [id number]   : 1945 Hospital: [address]   Date: 2019        Impression Plan   1. Acute respiratory failure   2. Cough  3. Shortness of breath  4. Cavitary lesion ALEXIS  5. Neck mass               · Continue with azithro/ceftriaxone  · Bronch today at 1:00 PM  · PNA panel pending  · AFB sputum cx x3  · PPD pending  · ENT consult for neck mass pending  · Airborne precautions  · NPO            Radiology  ( personally reviewed) CT neck- left and right upper. Left cavitary lesion- no prior imaging   ABG No results for input(s): PHI, PO2I, PCO2I in the last 72 hours. Subjective     Cc: cough    73 yo with PMHX of HTN presenting with one week of cough productive of clear sputum with some blood specks. Per son, pt has had a cough off and on since arriving to the [facility] April of this year. Pt does not speak English, nor does she seem very interested in the conversations going on around her. Vigorously eating food. Son denies any wt loss/fevers/chills. He does know of any TB exposures, but can't speak with any certainty on this. CT neck showed a 7x6 cm right neck mass along with biapical scarring and a ALEXIS cavitary lesions. Review of Systems:  A comprehensive review of systems was negative except for that written in the HPI. Overnight Events    Afebrile overnight  BP stable  O2 sats 96% on RA  RVP + for rhinovirus/enterovirus  ECHO:  EF 56-60%, moderately dilated left atrium, mild TR, PASP 30mmHg    Tells me that she feels well today. Denies SOB. Has some cough without sputum. Past Medical History:   Diagnosis Date    HTN (hypertension)     Neck mass       No past surgical history on file.    Prior to Admission medications    Not on File     Current Facility-Administered Medications   Medication Dose Route Frequency    sodium chloride (NS) flush 5-40 mL  5-40 mL IntraVENous Q8H    azithromycin (ZITHROMAX) 500 mg in 0.9% sodium chloride (MBP/ADV) 250 mL  500 mg IntraVENous Q24H    cefTRIAXone (ROCEPHIN) 2 g in 0.9% sodium chloride (MBP/ADV) 50 mL  2 g IntraVENous Q24H    metoprolol tartrate (LOPRESSOR) tablet 25 mg  25 mg Oral Q12H    sodium chloride (NS) flush 5-40 mL  5-40 mL IntraVENous Q8H    enoxaparin (LOVENOX) injection 30 mg  30 mg SubCUTAneous Q24H    tuberculin injection 5 Units  5 Units IntraDERMal ONCE    Tuberculin (Mantoux) Test: Read at 24 hrs and 48 hrs post placement. Thank you! 1 Each Other Q24H     No Known Allergies   Social History     Tobacco Use    Smoking status: Not on file   Substance Use Topics    Alcohol use: Not on file      No family history on file. Laboratory: I have personally reviewed the critical care flowsheet and labs. Recent Labs     06/17/19 0220 06/16/19  0558   WBC 8.6 12.2*   HGB 11.6 14.1   HCT 36.7 43.2    188     Recent Labs     06/17/19 0220 06/16/19  0558    138   K 4.0 3.2*   * 104   CO2 28 27   * 120*   BUN 15 13   CREA 0.68 0.70   CA 8.2* 8.8   MG 2.4 2.5*   ALB  --  4.4   SGOT  --  17   ALT  --  15       Objective:          Intake/Output Summary (Last 24 hours) at 6/17/2019 0816  Last data filed at 6/16/2019 1737  Gross per 24 hour   Intake 760 ml   Output 650 ml   Net 110 ml     EXAM:   GENERAL: well developed and in no distress, HEENT:  PERRL, EOMI, no alar flaring or epistaxis, oral mucosa moist without cyanosis, NECK:  no jugular vein distention, large, firm right sided neck mass LUNGS: CTA, no w/r/r, HEART:  Regular rate and rhythm with no MGR; no edema is present, ABDOMEN:  soft with no tenderness, bowel sounds present, EXTREMITIES:  warm with no cyanosis, SKIN:  no jaundice or ecchymosis and NEUROLOGIC:  Alert, grossly non-focal    Tessa Pineda, NP  [facility]

## 2019-06-17 NOTE — PROGRESS NOTES
Henri Karoline  1945  [id number]    Situation:  Verbal report received from:   Kuldeep Cummings RN   Procedure: Procedure(s):  BRONCHOSCOPY    Background:    Preoperative diagnosis: abnormal CXR  Postoperative diagnosis: Abnormal CT    :  Dr. Joselyn Chong   Assistant(s): Endoscopy Technician-1: Aurelio Almendarez  Endoscopy RN-1: Niecy Irizarry RN    Specimens: * No specimens in log *  H. Pylori  no    Assessment:  Intra-procedure medications   Anesthesia gave intra-procedure sedation and medications, see anesthesia flow sheet yes    Intravenous fluids: NS@ KVO     Vital signs stable   yes    Abdominal assessment: round and soft   yes    Recommendation:  Discharge patient per MD order inpatient.   Return to floor  yes  Family or Friend   Family   Permission to share finding with family or friend yes

## 2019-06-17 NOTE — PROGRESS NOTES
Patient resting with eyes closed. Patient responds to family members and communicates. Patient denies any pain. Vital signs are stable. Contact was made with Dr. Ella rCuz. Patient will be transported back to her room. Dr. Ella Cruz will check on the patient this evening.

## 2019-06-17 NOTE — PROGRESS NOTES
I spoke to Pharmacy [facility] The Pyxis was showing undocumented waste for Fentanyl. The patient was given 100 mcg of Fentanyl. Pharmacy advised to waste 0 in the Pyxis and make a comment about the undocumented waste.

## 2019-06-17 NOTE — PROGRESS NOTES
TRANSFER - OUT REPORT:    Verbal report given to KIRAN Ochoa(name) on Sharonda Kovacs  being transferred to [facility](unit) for routine post - op       Report consisted of patients Situation, Background, Assessment and   Recommendations(SBAR). Information from the following report(s) SBAR was reviewed with the receiving nurse. Lines:   Peripheral IV 06/16/19 Left Antecubital (Active)   Site Assessment Clean, dry, & intact 6/17/2019  3:01 PM   Phlebitis Assessment 0 6/17/2019  3:01 PM   Infiltration Assessment 0 6/17/2019  3:01 PM   Dressing Status Clean, dry, & intact 6/17/2019  3:01 PM   Dressing Type Transparent 6/17/2019  3:01 PM   Hub Color/Line Status Pink; Infusing 6/17/2019  3:01 PM   Action Taken Open ports on tubing capped 6/17/2019  3:01 PM   Alcohol Cap Used Yes 6/17/2019  9:45 AM        Opportunity for questions and clarification was provided. Patient transported with:   Kvng Cali RN was advised that the  patient was given Narcan and would return to the floor after being monitored post-op and once the patient was stable.

## 2019-06-17 NOTE — PROGRESS NOTES
Dr. Veena Gastelum performed a Bronchoscopy under conscious sedation, 100 mcg of IV Fentanyl was given and 2mg of IV versed. The patient began to desatt, O2 levels of 70's. Patient was given Narcan and quickly improved to 100% and was awake and alert. Patient was stable and transported to recovery, report given to [facility].

## 2019-06-17 NOTE — PERIOP NOTES
Lloyd Monzon  1945  [id number]    Situation:    Scheduled Procedure: Procedure(s):  BRONCHOSCOPY  Verbal report received from: Debby Holliday RN  Preoperative diagnosis: abnormal CXR    Background:    Procedure: Procedure(s):  BRONCHOSCOPY  Physician performing procedure; Dr. Davida Muñoz RN    NPO Status/Last PO Intake: midnight    Pregnancy Test:Not applicable If yes, result: none    Is the patient taking Blood Thinners: YES If yes, list: Lovenox and last taken 6/16/2019  Is the patient diabetic:no       If yes, what was the last BS:    Time taken? Anything given? no           Does the patient have a Pacemaker/Defibrillator in place?: no   Does the patient need antibiotics before/during/after procedure: no   If the patient is having a colon, How much prep was drank? What were the Colon prep results? Does the patient have SCD in place:no   Is patient on CONTACT precautions:no        If yes, what kind of CONTACT precautions:     Assessment:  Are the vital signs stable prior to patient coming to ENDO?  yes  Is the patient alert/oriented and able to sign consent for the procedures:yes    Does the patient have a patient IV in place?  yes     Recommendation:  Family or Friend present yes     Permission to share finding with Family or Friend yes

## 2019-06-17 NOTE — PROGRESS NOTES
6/17/2019  Reason for Admission:   PNA, rhinovirus. Pt has hx of living in [facility].  Pt is on droplet precautions and checking PPD                   RRAT Score:    4                 Plan for utilizing home health:    No                      Current Advanced Directive/Advance Care Plan: Full code; Adv Care Plan not on file                         Transition of Care Plan:  1. Home with family - grandson, Abhay Granda ([phone]) is the contact. 2.  Pt is uninsured. CM will ask [employer] to do a financial screening. 3.  Community resources ie [facility] will be provided to pt. She was already given a PCP list.    CM will continue to follow pt's hospital course.    Jody

## 2019-06-17 NOTE — PROGRESS NOTES
[facility]    Medical Progress Note      NAME: Armando Rivera   :  1945  MRM:  [id number]    Date/Time: 2019  9:08 AM          Assessment and Plan:     Pneumonia / Cough / Abnormal chest x-ray - POA, unclear etiology. Hx of living in [facility].  Checking PPD, pneumonia serologies. Viral panel positive for rhinovirus, but there may be more to it than just virus. For now continue Ceftriaxone and Azithromycin. Consulted pulm. They plan bronchoscopy this PM.  Non smoker      Neck mass - Present for years, unclear etiology. Never had a Bx. Consulted ENT. They may need to do upper scope during broncoscopy to see trachea     Hypokalemia - Repleted. Follow     HTN (hypertension) - Per patient. Not on Meds. Unremarkable ECHO. A bit better BP and pulse after starting metoprolol. Sepsis / Leukocytosis / Fever / Sinus tachycardia - POA, due to PNA vs other. NOT severe sepsis. SIRS now resolved. Supportive care. Follow Cx.        Subjective:     Chief Complaint:  A bit better, awaiting bronch    ROS:  (bold if positive, if negative)    Tolerating PT  Tolerating Diet        Objective:     Last 24hrs VS reviewed since prior progress note.  Most recent are:    Visit Vitals  /61 (BP 1 Location: Right arm, BP Patient Position: Supine)   Pulse 96   Temp 99.4 °F (37.4 °C)   Resp 16   Ht 4' 11\" (1.499 m)   Wt 42.2 kg (93 lb)   SpO2 98%   BMI 18.78 kg/m²     SpO2 Readings from Last 6 Encounters:   19 98%            Intake/Output Summary (Last 24 hours) at 2019 0908  Last data filed at 2019 1737  Gross per 24 hour   Intake 760 ml   Output 650 ml   Net 110 ml        Physical Exam:    Gen:  Thin, frail, in no acute distress  HEENT:  Pink conjunctivae, PERRL, hearing intact to voice, moist mucous membranes  Neck:  Supple, large R mass, thyroid non-tender  Resp:  No accessory muscle use, clear breath sounds without wheezes rales or rhonchi  Card:  No murmurs, tachycardic S1, S2 distant thrills, bruits or peripheral edema  Abd:  Soft, non-tender, non-distended, normoactive bowel sounds are present, no mass  Lymph:  No cervical or inguinal adenopathy  Musc:  No cyanosis or clubbing  Skin:  No rashes or ulcers, skin turgor is good  Neuro:  Cranial nerves are grossly intact, no focal motor weakness, follows commands   Psych: Moderate insight, oriented to person, place and time, alert    Telemetry reviewed:   normal sinus rhythm  __________________________________________________________________  Medications Reviewed: (see below)  Medications:     Current Facility-Administered Medications   Medication Dose Route Frequency    sodium chloride (NS) flush 5-40 mL  5-40 mL IntraVENous Q8H    sodium chloride (NS) flush 5-40 mL  5-40 mL IntraVENous PRN    azithromycin (ZITHROMAX) 500 mg in 0.9% sodium chloride (MBP/ADV) 250 mL  500 mg IntraVENous Q24H    cefTRIAXone (ROCEPHIN) 2 g in 0.9% sodium chloride (MBP/ADV) 50 mL  2 g IntraVENous Q24H    metoprolol tartrate (LOPRESSOR) tablet 25 mg  25 mg Oral Q12H    sodium chloride (NS) flush 5-40 mL  5-40 mL IntraVENous Q8H    sodium chloride (NS) flush 5-40 mL  5-40 mL IntraVENous PRN    zolpidem (AMBIEN) tablet 5 mg  5 mg Oral QHS PRN    acetaminophen (TYLENOL) tablet 650 mg  650 mg Oral Q4H PRN    diphenhydrAMINE (BENADRYL) capsule 25 mg  25 mg Oral Q4H PRN    ondansetron (ZOFRAN) injection 4 mg  4 mg IntraVENous Q4H PRN    enoxaparin (LOVENOX) injection 30 mg  30 mg SubCUTAneous Q24H    Tuberculin (Mantoux) Test: Read at 24 hrs and 48 hrs post placement. Thank you!   1 Each Other Q24H        Lab Data Reviewed: (see below)  Lab Review:     Recent Labs     06/17/19  0220 06/16/19  0558   WBC 8.6 12.2*   HGB 11.6 14.1   HCT 36.7 43.2    188     Recent Labs     06/17/19  0220 06/16/19  0558    138   K 4.0 3.2*   * 104   CO2 28 27   * 120*   BUN 15 13   CREA 0.68 0.70   CA 8.2* 8.8   MG 2.4 2.5*   ALB  --  4.4   TBILI --  0.6   SGOT  --  17   ALT  --  15     No results found for: GLUCPOC  No results for input(s): PH, PCO2, PO2, HCO3, FIO2 in the last 72 hours. No results for input(s): INR in the last 72 hours. No lab exists for component: INREXT  All Micro Results     Procedure Component Value Units Date/Time    AFB CULTURE + SMEAR W/RFLX ID FROM CULTURE [[id number]] Collected:  06/17/19 0220    Order Status:  Completed Updated:  06/17/19 0334    AFB CULTURE + SMEAR W/RFLX ID FROM CULTURE [[id number]] Collected:  06/16/19 1750    Order Status:  Completed Updated:  06/16/19 1835    S. Alton Purple, UR/CSF [[id number]] Collected:  06/16/19 1617    Order Status:  Completed Specimen:  Other Updated:  06/16/19 1630    LEGIONELLA PNEUMOPHILA AG, URINE [[id number]] Collected:  06/16/19 1617    Order Status:  Completed Specimen:  Urine Updated:  06/16/19 1630    RESPIRATORY PANEL,PCR,NASOPHARYNGEAL [[id number]]  (Abnormal) Collected:  06/16/19 0953    Order Status:  Completed Specimen:  Nasopharyngeal Updated:  06/16/19 1412     Adenovirus NOT DETECTED        Coronavirus 229E NOT DETECTED        Coronavirus HKU1 NOT DETECTED        Coronavirus CVNL63 NOT DETECTED        Coronavirus OC43 NOT DETECTED        Metapneumovirus NOT DETECTED        Rhinovirus and Enterovirus DETECTED        Influenza A NOT DETECTED        Influenza A, subtype H1 NOT DETECTED        Influenza A, subtype H3 NOT DETECTED        INFLUENZA A H1N1 PCR NOT DETECTED        Influenza B NOT DETECTED        Parainfluenza 1 NOT DETECTED        Parainfluenza 2 NOT DETECTED        Parainfluenza 3 NOT DETECTED        Parainfluenza virus 4 NOT DETECTED        RSV by PCR NOT DETECTED        Bordetella pertussis - PCR NOT DETECTED        Chlamydophila pneumoniae DNA, QL, PCR NOT DETECTED        Mycoplasma pneumoniae DNA, QL, PCR NOT DETECTED       AFB CULTURE + SMEAR W/RFLX ID FROM CULTURE [[id number]] Collected:  06/16/19 0956    Order Status:  Completed Updated:  06/16/19 1010 LEGIONELLA PNEUMOPHILA AG, URINE [[id number]]     Order Status:  Canceled Specimen:  Urine     S. Waynetta Spurling, UR/CSF [[id number]]     Order Status:  Canceled Specimen:  Other     URINE CULTURE HOLD SAMPLE [[id number]] Collected:  06/16/19 0634    Order Status:  Completed Specimen:  Urine from Serum Updated:  06/16/19 0653     Urine culture hold       URINE ON HOLD IN MICROBIOLOGY DEPT FOR 3 DAYS. IF UNPRESERVED URINE IS SUBMITTED, IT CANNOT BE USED FOR ADDITIONAL TESTING AFTER 24 HRS, RECOLLECTION WILL BE REQUIRED. CULTURE, BLOOD [[id number]] Collected:  06/16/19 0558    Order Status:  Completed Specimen:  Blood Updated:  06/16/19 0617          Other pertinent lab: none    Total time spent with patient: 39 Minutes I reviewed chart, notes, data and current medications in the medical record. I have examined and treated the patient at bedside during this period.                  Care Plan discussed with: Patient, Family, Care Manager, Nursing Staff, Consultant/Specialist and >50% of time spent in counseling and coordination of care    Discussed:  Care Plan and D/C Planning    Prophylaxis:  Hep SQ and H2B/PPI    Disposition:  Home w/Family           ___________________________________________________    Attending Physician: Keren Early MD

## 2019-06-18 PROBLEM — J20.6 ACUTE BRONCHITIS DUE TO RHINOVIRUS: Status: ACTIVE | Noted: 2019-06-18

## 2019-06-18 LAB
FLUID CULTURE, SPNG2: NORMAL
L PNEUMO1 AG UR QL IA: NEGATIVE
ORGANISM ID, SPNG3: NORMAL
PLEASE NOTE, SPNG4: NORMAL
S PNEUM AG SPEC QL LA: NEGATIVE
SPECIMEN SOURCE: NORMAL
SPECIMEN SOURCE: NORMAL
SPECIMEN, SPNG1: NORMAL

## 2019-06-18 PROCEDURE — 74011250636 HC RX REV CODE- 250/636: Performed by: INTERNAL MEDICINE

## 2019-06-18 PROCEDURE — 65270000029 HC RM PRIVATE

## 2019-06-18 PROCEDURE — 36415 COLL VENOUS BLD VENIPUNCTURE: CPT

## 2019-06-18 PROCEDURE — 74011000258 HC RX REV CODE- 258: Performed by: INTERNAL MEDICINE

## 2019-06-18 PROCEDURE — 86480 TB TEST CELL IMMUN MEASURE: CPT

## 2019-06-18 PROCEDURE — 74011250637 HC RX REV CODE- 250/637: Performed by: INTERNAL MEDICINE

## 2019-06-18 RX ADMIN — AZITHROMYCIN MONOHYDRATE 500 MG: 500 INJECTION, POWDER, LYOPHILIZED, FOR SOLUTION INTRAVENOUS at 06:58

## 2019-06-18 RX ADMIN — METOPROLOL TARTRATE 25 MG: 25 TABLET ORAL at 22:05

## 2019-06-18 RX ADMIN — Medication 10 ML: at 13:43

## 2019-06-18 RX ADMIN — ENOXAPARIN SODIUM 30 MG: 30 INJECTION SUBCUTANEOUS at 13:43

## 2019-06-18 RX ADMIN — CEFTRIAXONE 2 G: 2 INJECTION, POWDER, FOR SOLUTION INTRAMUSCULAR; INTRAVENOUS at 05:41

## 2019-06-18 RX ADMIN — Medication 10 ML: at 22:06

## 2019-06-18 RX ADMIN — METOPROLOL TARTRATE 25 MG: 25 TABLET ORAL at 09:19

## 2019-06-18 NOTE — PROGRESS NOTES
[facility]     Name: Eyal Carlson MRN: [id number]   : 1945 Hospital: [address]   Date: 2019        Impression Plan   1. Acute respiratory failure   2. Cough  3. Shortness of breath  4. Cavitary lesion ALEXIS  5. Neck mass               · Continue with azithro/ceftriaxone  · Failed bronch due to inability to pass scope passed neck mass  · PNA panel pending  · AFB sputum cx x3 pending  · PPD pending  · ENT following, may biopsy neck mass during admission vs OP  · Airborne precautions  · Consult speech to eval           Radiology  ( personally reviewed) CT neck- left and right upper. Left cavitary lesion- no prior imaging   ABG No results for input(s): PHI, PO2I, PCO2I in the last 72 hours. Subjective     Cc: cough    71 yo with PMHX of HTN presenting with one week of cough productive of clear sputum with some blood specks. Per son, pt has had a cough off and on since arriving to the [facility] April of this year. Pt does not speak English, nor does she seem very interested in the conversations going on around her. Vigorously eating food. Son denies any wt loss/fevers/chills. He does know of any TB exposures, but can't speak with any certainty on this. CT neck showed a 7x6 cm right neck mass along with biapical scarring and a ALEXIS cavitary lesions. Review of Systems:  A comprehensive review of systems was negative except for that written in the HPI. Overnight Events    Afebrile overnight  BP stable  O2 sats 96% on RA  RVP + for rhinovirus/enterovirus  ECHO:  EF 56-60%, moderately dilated left atrium, mild TR, PASP 30mmHg    Feeling better today. Denies SOB. Past Medical History:   Diagnosis Date    HTN (hypertension)     Neck mass       History reviewed. No pertinent surgical history.    Prior to Admission medications    Not on File     Current Facility-Administered Medications   Medication Dose Route Frequency    sodium chloride (NS) flush 5-40 mL  5-40 mL IntraVENous Q8H    sodium chloride (NS) flush 5-40 mL  5-40 mL IntraVENous Q8H    azithromycin (ZITHROMAX) 500 mg in 0.9% sodium chloride (MBP/ADV) 250 mL  500 mg IntraVENous Q24H    cefTRIAXone (ROCEPHIN) 2 g in 0.9% sodium chloride (MBP/ADV) 50 mL  2 g IntraVENous Q24H    metoprolol tartrate (LOPRESSOR) tablet 25 mg  25 mg Oral Q12H    sodium chloride (NS) flush 5-40 mL  5-40 mL IntraVENous Q8H    enoxaparin (LOVENOX) injection 30 mg  30 mg SubCUTAneous Q24H     No Known Allergies   Social History     Tobacco Use    Smoking status: Never Smoker    Smokeless tobacco: Never Used   Substance Use Topics    Alcohol use: Never     Frequency: Never      History reviewed. No pertinent family history. Laboratory: I have personally reviewed the critical care flowsheet and labs.      Recent Labs     06/17/19  0220 06/16/19  0558   WBC 8.6 12.2*   HGB 11.6 14.1   HCT 36.7 43.2    188     Recent Labs     06/17/19  0220 06/16/19  0558    138   K 4.0 3.2*   * 104   CO2 28 27   * 120*   BUN 15 13   CREA 0.68 0.70   CA 8.2* 8.8   MG 2.4 2.5*   ALB  --  4.4   SGOT  --  17   ALT  --  15       Objective:       No intake or output data in the 24 hours ending 06/18/19 1040  EXAM:   GENERAL: well developed and in no distress,   HEENT:  PERRL, EOMI, no alar flaring or epistaxis, oral mucosa moist without cyanosis,   NECK:  no jugular vein distention, large, firm right sided neck mass   LUNGS: CTA, no w/r/r,   HEART:  Regular rate and rhythm with no MGR; no edema is present,   ABDOMEN:  soft with no tenderness, bowel sounds present,   EXTREMITIES:  warm with no cyanosis,   SKIN:  no jaundice or ecchymosis  NEUROLOGIC:  Alert, grossly non-focal    Mert Cunningham

## 2019-06-18 NOTE — PROGRESS NOTES
5824- Bedside and Verbal shift change report given to [facility] (oncoming nurse) by Abe Sanchez (offgoing nurse). Report included the following information SBAR, Kardex, Intake/Output, MAR and Recent Results. 3658- This RN in patient room, pt PPD at 48 hrs read to have approximately 30 mm of induration. 1427- Call from [place] in radiology, updated on patient, stated that they were unsure if aspiration to happen today or tomorrow, stated she will call back with more information. 26- Return call from [place] in radiology stated Dr. Bernadette Barrett would likely preform fine needle aspiration tomorrow but would like to see the reccomdendations of ENT, also stated that procedure would likely be superficial and NPO status would not be necessary. Continue to hold lovenox. Will call Dr. Perry Mathew. 1717- Call placed to Dr. Perry Mathew, updated stated to resume patient's diet and patient to have fine needle aspiration in AM tomorrow. [address]- Bedside and Verbal shift change report given to KIRAN Benavides (oncoming nurse) by [facility] [address] (offgoing nurse). Report included the following information SBAR, Kardex, Intake/Output, MAR and Recent Results.

## 2019-06-18 NOTE — PROGRESS NOTES
[facility]    Medical Progress Note      NAME: Katt Gama   :  1945  MRM:  [id number]    Date/Time: 2019  9:08 AM          Assessment and Plan:     Pneumonia / Cough / Abnormal chest x-ray - POA, unclear etiology. Hx of living in [facility].  So far negative testing for PPD, pneumonia serologies. Viral panel positive for rhinovirus, and that may be all this current fever/coughing episode involves. For now continue Ceftriaxone and Azithromycin. Consulted pulm. They attempted bronchoscopy , but failed due to mass effect. Neck mass - Present for years, unclear etiology. Never had a Bx. Consulted ENT. They are considering bx prior to DC, or as outpatient     Hypokalemia - Repleted. Follow     HTN (hypertension) - Per patient. Not on Meds. Unremarkable ECHO. A bit better BP and pulse after starting metoprolol. Sepsis / Leukocytosis / Fever / Sinus tachycardia - POA, due to rhinovirus vs PNA vs other. NOT severe sepsis. SIRS now resolved. Supportive care. Follow Cx.        Subjective:     Chief Complaint:  Did not tolerate bronch, awaiting final plan from pulm and ENT    ROS:  (bold if positive, if negative)    Tolerating PT  Tolerating Diet        Objective:     Last 24hrs VS reviewed since prior progress note.  Most recent are:    Visit Vitals  /50 (BP Patient Position: At rest)   Pulse 82   Temp 98.7 °F (37.1 °C)   Resp 21   Ht 4' 11\" (1.499 m)   Wt 42.2 kg (93 lb)   SpO2 97%   BMI 18.78 kg/m²     SpO2 Readings from Last 6 Encounters:   19 97%    O2 Flow Rate (L/min): 2 l/min     No intake or output data in the 24 hours ending 19 1024     Physical Exam:    Gen:  Thin, frail, in no acute distress  HEENT:  Pink conjunctivae, PERRL, hearing intact to voice, moist mucous membranes  Neck:  Supple, large R mass, thyroid non-tender  Resp:  No accessory muscle use, clear breath sounds without wheezes rales or rhonchi  Card:  No murmurs, tachycardic S1, S2 distant thrills, bruits or peripheral edema  Abd:  Soft, non-tender, non-distended, normoactive bowel sounds are present, no mass  Lymph:  No cervical or inguinal adenopathy  Musc:  No cyanosis or clubbing  Skin:  No rashes or ulcers, skin turgor is good  Neuro:  Cranial nerves are grossly intact, no focal motor weakness, follows commands   Psych:   Moderate insight, oriented to person, place and time, alert    Telemetry reviewed:   normal sinus rhythm  __________________________________________________________________  Medications Reviewed: (see below)  Medications:     Current Facility-Administered Medications   Medication Dose Route Frequency    sodium chloride (NS) flush 5-40 mL  5-40 mL IntraVENous Q8H    sodium chloride (NS) flush 5-40 mL  5-40 mL IntraVENous PRN    sodium chloride (NS) flush 5-40 mL  5-40 mL IntraVENous Q8H    sodium chloride (NS) flush 5-40 mL  5-40 mL IntraVENous PRN    azithromycin (ZITHROMAX) 500 mg in 0.9% sodium chloride (MBP/ADV) 250 mL  500 mg IntraVENous Q24H    cefTRIAXone (ROCEPHIN) 2 g in 0.9% sodium chloride (MBP/ADV) 50 mL  2 g IntraVENous Q24H    metoprolol tartrate (LOPRESSOR) tablet 25 mg  25 mg Oral Q12H    sodium chloride (NS) flush 5-40 mL  5-40 mL IntraVENous Q8H    sodium chloride (NS) flush 5-40 mL  5-40 mL IntraVENous PRN    zolpidem (AMBIEN) tablet 5 mg  5 mg Oral QHS PRN    acetaminophen (TYLENOL) tablet 650 mg  650 mg Oral Q4H PRN    diphenhydrAMINE (BENADRYL) capsule 25 mg  25 mg Oral Q4H PRN    ondansetron (ZOFRAN) injection 4 mg  4 mg IntraVENous Q4H PRN    enoxaparin (LOVENOX) injection 30 mg  30 mg SubCUTAneous Q24H        Lab Data Reviewed: (see below)  Lab Review:     Recent Labs     06/17/19  0220 06/16/19  0558   WBC 8.6 12.2*   HGB 11.6 14.1   HCT 36.7 43.2    188     Recent Labs     06/17/19  0220 06/16/19  0558    138   K 4.0 3.2*   * 104   CO2 28 27   * 120*   BUN 15 13   CREA 0.68 0.70   CA 8.2* 8.8   MG 2.4 2.5*   ALB  --  4.4   TBILI  --  0.6   SGOT  --  17   ALT  --  15     No results found for: GLUCPOC  No results for input(s): PH, PCO2, PO2, HCO3, FIO2 in the last 72 hours. No results for input(s): INR in the last 72 hours. No lab exists for component: Tim Kinsey  All Micro Results     Procedure Component Value Units Date/Time    CULTURE, BLOOD [[id number]] Collected:  06/16/19 0558    Order Status:  Completed Specimen:  Blood Updated:  06/18/19 0507     Special Requests: NO SPECIAL REQUESTS        Culture result: NO GROWTH 2 DAYS       AFB CULTURE + SMEAR W/RFLX ID FROM CULTURE [[id number]] Collected:  06/17/19 0220    Order Status:  Completed Updated:  06/17/19 0334    AFB CULTURE + SMEAR W/RFLX ID FROM CULTURE [[id number]] Collected:  06/16/19 1750    Order Status:  Completed Updated:  06/16/19 1835    S. Fred Keila, UR/CSF [[id number]] Collected:  06/16/19 1617    Order Status:  Completed Specimen:  Other Updated:  06/16/19 1630    LEGIONELLA PNEUMOPHILA AG, URINE [[id number]] Collected:  06/16/19 1617    Order Status:  Completed Specimen:  Urine Updated:  06/16/19 1630    RESPIRATORY PANEL,PCR,NASOPHARYNGEAL [[id number]]  (Abnormal) Collected:  06/16/19 0953    Order Status:  Completed Specimen:  Nasopharyngeal Updated:  06/16/19 1412     Adenovirus NOT DETECTED        Coronavirus 229E NOT DETECTED        Coronavirus HKU1 NOT DETECTED        Coronavirus CVNL63 NOT DETECTED        Coronavirus OC43 NOT DETECTED        Metapneumovirus NOT DETECTED        Rhinovirus and Enterovirus DETECTED        Influenza A NOT DETECTED        Influenza A, subtype H1 NOT DETECTED        Influenza A, subtype H3 NOT DETECTED        INFLUENZA A H1N1 PCR NOT DETECTED        Influenza B NOT DETECTED        Parainfluenza 1 NOT DETECTED        Parainfluenza 2 NOT DETECTED        Parainfluenza 3 NOT DETECTED        Parainfluenza virus 4 NOT DETECTED        RSV by PCR NOT DETECTED        Bordetella pertussis - PCR NOT DETECTED Chlamydophila pneumoniae DNA, QL, PCR NOT DETECTED        Mycoplasma pneumoniae DNA, QL, PCR NOT DETECTED       AFB CULTURE + SMEAR W/RFLX ID FROM CULTURE [[id number]] Collected:  06/16/19 0956    Order Status:  Completed Updated:  06/16/19 1010    LEGIONELLA PNEUMOPHILA AG, URINE [[id number]]     Order Status:  Canceled Specimen:  Urine     RAJESH Bay, UR/CSF [[id number]]     Order Status:  Canceled Specimen:  Other     URINE CULTURE HOLD SAMPLE [[id number]] Collected:  06/16/19 0634    Order Status:  Completed Specimen:  Urine from Serum Updated:  06/16/19 0653     Urine culture hold       URINE ON HOLD IN MICROBIOLOGY DEPT FOR 3 DAYS. IF UNPRESERVED URINE IS SUBMITTED, IT CANNOT BE USED FOR ADDITIONAL TESTING AFTER 24 HRS, RECOLLECTION WILL BE REQUIRED. Other pertinent lab: none    Total time spent with patient: 39 Minutes I reviewed chart, notes, data and current medications in the medical record. I have examined and treated the patient at bedside during this period.                  Care Plan discussed with: Patient, Family, Care Manager, Nursing Staff, Consultant/Specialist and >50% of time spent in counseling and coordination of care    Discussed:  Care Plan and D/C Planning    Prophylaxis:  Hep SQ and H2B/PPI    Disposition:  Home w/Family           ___________________________________________________    Attending Physician: Aleksandar Gross MD

## 2019-06-18 NOTE — PROGRESS NOTES
IR Angio notified Rad RN of order received for a FNA of a neck mass. Rad RN called Tawanda Davis, low RN to acknowledge that the order was received but in the wrong modality so RH RN will have to consult IR MD.  Tawanda Davis, RN also confirmed that Pt received Lovenox at 1343 and ate around 1100 hrs. Order and history reviewed with JEFF Dash MD.  JEFF Dash MD recommended waiting for ENT consult to be completed to determine plan of care. MD Maggie advised that this procedure would likely be done in Ultrasound if done by IR MD but imaging may not be necessary at all. Awaiting ENT recommendations. [address], [facility] notified that procedure may be done in [place] tomorrow. 1503 - Attempted x2 to notify Tawanda Davis RN of the aforementioned but she was unavailable. Requested call back.

## 2019-06-18 NOTE — PROGRESS NOTES
Case discussed with Dr. Beto Suggs as well as Dr. Shelly Caro. From a pulmonary standpoint it would be benefitial to the patient to have mass drained of any possible fluid to hopefully help the mass effect that mass is having on airway and vocal cords. Dr. Beto Suggs would like FNA for cells/cytology and drainage. Communicated this with Dr. Shelly Caro in hopes that IR will be able to try to drain tomorrow.

## 2019-06-18 NOTE — PROGRESS NOTES
Received order for swallow eval. Will wait for ENT. RN and pulm document no issues with swallowing. She does have mass effect of mass on vocal cords per pulm. Hold swallow eval for now.

## 2019-06-18 NOTE — PROGRESS NOTES
Bedside shift change report given to GIA (oncoming nurse) by Kami Ricks (offgoing nurse). Report included the following information SBAR, Kardex and MAR.

## 2019-06-18 NOTE — PROGRESS NOTES
6/18/2019   CARE MANAGEMENT NOTE:  CM reviewed EMR for clinical updates. Pt had unsuccessful bronch attempt yesterday. Also, seen by ENT for neck mass. Transition of Care Plan:  1. Home with family - grandson, Shakira Nicole ([phone]) is the contact. 2.  Pt is uninsured. [employer] is aware and will assess pt for Medicaid eligibility. 3.  Community resources ie [address] [place] [address] will be provided to pt. She was already given a PCP list.     CM will continue to follow pt's hospital course.    Jody

## 2019-06-19 ENCOUNTER — APPOINTMENT (OUTPATIENT)
Dept: ULTRASOUND IMAGING | Age: 74
DRG: 871 | End: 2019-06-19
Attending: INTERNAL MEDICINE
Payer: SELF-PAY

## 2019-06-19 VITALS
TEMPERATURE: 98.3 F | BODY MASS INDEX: 18.75 KG/M2 | RESPIRATION RATE: 16 BRPM | HEIGHT: 59 IN | DIASTOLIC BLOOD PRESSURE: 75 MMHG | HEART RATE: 73 BPM | OXYGEN SATURATION: 98 % | SYSTOLIC BLOOD PRESSURE: 140 MMHG | WEIGHT: 93 LBS

## 2019-06-19 PROCEDURE — 74011250636 HC RX REV CODE- 250/636: Performed by: INTERNAL MEDICINE

## 2019-06-19 PROCEDURE — 88112 CYTOPATH CELL ENHANCE TECH: CPT

## 2019-06-19 PROCEDURE — 74011250637 HC RX REV CODE- 250/637: Performed by: INTERNAL MEDICINE

## 2019-06-19 PROCEDURE — 88305 TISSUE EXAM BY PATHOLOGIST: CPT

## 2019-06-19 PROCEDURE — 74011000258 HC RX REV CODE- 258: Performed by: INTERNAL MEDICINE

## 2019-06-19 PROCEDURE — 0W963ZX DRAINAGE OF NECK, PERCUTANEOUS APPROACH, DIAGNOSTIC: ICD-10-PCS | Performed by: RADIOLOGY

## 2019-06-19 PROCEDURE — C1729 CATH, DRAINAGE: HCPCS

## 2019-06-19 PROCEDURE — 10005 FNA BX W/US GDN 1ST LES: CPT

## 2019-06-19 PROCEDURE — 77030014115

## 2019-06-19 RX ORDER — LIDOCAINE HYDROCHLORIDE 10 MG/ML
5 INJECTION, SOLUTION EPIDURAL; INFILTRATION; INTRACAUDAL; PERINEURAL
Status: COMPLETED | OUTPATIENT
Start: 2019-06-19 | End: 2019-06-19

## 2019-06-19 RX ORDER — METOPROLOL TARTRATE 25 MG/1
25 TABLET, FILM COATED ORAL EVERY 12 HOURS
Qty: 60 TAB | Refills: 0 | Status: SHIPPED | OUTPATIENT
Start: 2019-06-19 | End: 2019-07-19

## 2019-06-19 RX ADMIN — CEFTRIAXONE 2 G: 2 INJECTION, POWDER, FOR SOLUTION INTRAMUSCULAR; INTRAVENOUS at 05:23

## 2019-06-19 RX ADMIN — LIDOCAINE HYDROCHLORIDE 5 ML: 10 INJECTION, SOLUTION EPIDURAL; INFILTRATION; INTRACAUDAL; PERINEURAL at 15:00

## 2019-06-19 RX ADMIN — AZITHROMYCIN MONOHYDRATE 500 MG: 500 INJECTION, POWDER, LYOPHILIZED, FOR SOLUTION INTRAVENOUS at 06:04

## 2019-06-19 RX ADMIN — Medication 10 ML: at 05:26

## 2019-06-19 RX ADMIN — METOPROLOL TARTRATE 25 MG: 25 TABLET ORAL at 08:42

## 2019-06-19 NOTE — PROGRESS NOTES
Bedside and Verbal shift change report given to KIRAN Sr (oncoming nurse) by RN char (offgoing nurse). Report included the following information SBAR, Kardex, Procedure Summary, Intake/Output and MAR.

## 2019-06-19 NOTE — PROGRESS NOTES
Patient was resting in bed during shift changed. Family member at bedside. Patient was instructed to be NPO after MN, family translated to her. For FNA Biopsy tomorrow. Continue to monitor.

## 2019-06-19 NOTE — PROGRESS NOTES
[facility]     Name: Ely Mathew MRN: [id number]   : 1945 Hospital: [address]   Date: 2019        Impression Plan   1. Acute respiratory failure   2. Cough  3. Shortness of breath  4. Cavitary lesion ALEXIS  5. Neck mass               · Continue with azithro/ceftriaxone  · Failed bronch due to inability to pass scope passed neck mass  · PNA panel pending  · AFB sputum cx x3 neg  · PPD pending  · ENT following, plan for FNA today in IR             Radiology  ( personally reviewed) CT neck- left and right upper. Left cavitary lesion- no prior imaging   ABG No results for input(s): PHI, PO2I, PCO2I in the last 72 hours. Subjective     Cc: cough    73 yo with PMHX of HTN presenting with one week of cough productive of clear sputum with some blood specks. Per son, pt has had a cough off and on since arriving to the [facility]\"" April of this year. Pt does not speak English, nor does she seem very interested in the conversations going on around her. Vigorously eating food. Son denies any wt loss/fevers/chills. He does know of any TB exposures, but can't speak with any certainty on this. CT neck showed a 7x6 cm right neck mass along with biapical scarring and a ALEXIS cavitary lesions. Review of Systems:  A comprehensive review of systems was negative except for that written in the HPI. Overnight Events    Afebrile overnight  BP stable  O2 sats 96% on RA  RVP + for rhinovirus/enterovirus  ECHO:  EF 56-60%, moderately dilated left atrium, mild TR, PASP 30mmHg    Feeling better today. Denies SOB. Past Medical History:   Diagnosis Date    HTN (hypertension)     Neck mass       History reviewed. No pertinent surgical history. Prior to Admission medications    Medication Sig Start Date End Date Taking? Authorizing Provider   metoprolol tartrate (LOPRESSOR) 25 mg tablet Take 1 Tab by mouth every twelve (12) hours for 30 days.  19 Yes Alberto Garcia Hardeep Yeung MD     Current Facility-Administered Medications   Medication Dose Route Frequency    sodium chloride (NS) flush 5-40 mL  5-40 mL IntraVENous Q8H    sodium chloride (NS) flush 5-40 mL  5-40 mL IntraVENous Q8H    azithromycin (ZITHROMAX) 500 mg in 0.9% sodium chloride (MBP/ADV) 250 mL  500 mg IntraVENous Q24H    cefTRIAXone (ROCEPHIN) 2 g in 0.9% sodium chloride (MBP/ADV) 50 mL  2 g IntraVENous Q24H    metoprolol tartrate (LOPRESSOR) tablet 25 mg  25 mg Oral Q12H    sodium chloride (NS) flush 5-40 mL  5-40 mL IntraVENous Q8H    enoxaparin (LOVENOX) injection 30 mg  30 mg SubCUTAneous Q24H     No Known Allergies   Social History     Tobacco Use    Smoking status: Never Smoker    Smokeless tobacco: Never Used   Substance Use Topics    Alcohol use: Never     Frequency: Never      History reviewed. No pertinent family history. Laboratory: I have personally reviewed the critical care flowsheet and labs. Recent Labs     06/17/19  0220   WBC 8.6   HGB 11.6   HCT 36.7        Recent Labs     06/17/19  0220      K 4.0   *   CO2 28   *   BUN 15   CREA 0.68   CA 8.2*   MG 2.4       Objective:          Intake/Output Summary (Last 24 hours) at 6/19/2019 0946  Last data filed at 6/19/2019 0604  Gross per 24 hour   Intake 300 ml   Output    Net 300 ml     EXAM:   GENERAL: well developed and in no distress,   HEENT:  PERRL, EOMI, no alar flaring or epistaxis, oral mucosa moist without cyanosis,   NECK:  no jugular vein distention, large, firm right sided neck mass   LUNGS: CTA, no w/r/r,   HEART:  Regular rate and rhythm with no MGR; no edema is present,   ABDOMEN:  soft with no tenderness, bowel sounds present,   EXTREMITIES:  warm with no cyanosis,   SKIN:  no jaundice or ecchymosis  NEUROLOGIC:  Alert, grossly non-focal    Janit Mert Omer

## 2019-06-19 NOTE — ROUTINE PROCESS
Interdisciplinary team rounds were held 6/19/2019 with the following team members:Care Management, Nursing, Pharmacy, Physical Therapy and Physician. Plan of care discussed. See clinical pathway and/or care plan for interventions and desired outcomes. Plan: dc after biopsy. Follow up out patient.

## 2019-06-19 NOTE — DISCHARGE INSTRUCTIONS
Patient Discharge Instructions    Orlin Paniagua / [id number] : 1945    Admitted 2019 Discharged: 2019     Primary Diagnoses  Problem List as of 2019 Date Reviewed: 2019           Acute bronchitis due to Rhinovirus   * (Principal) Pneumonia   Neck mass   Hypokalemia   Leukocytosis   HTN (hypertension)   Abnormal chest x-ray   Sinus tachycardia   Sepsis ([address]          Take Home Medications     · It is important that you take the medication exactly as they are prescribed. · Keep your medication in the bottles provided by the pharmacist and keep a list of the medication names, dosages, and times to be taken in your wallet. · Do not take other medications without consulting your doctor. What to do at Home    Recommended diet: Regular Diet and Increase noncaffeinated fluids    Recommended activity: Activity as tolerated    If you experience worse symptoms, please follow up with the Monse Humphries or Dr Lana South in ENT. Follow-up with your PCP and Dr Lana oSuth in 1 week        Information obtained by :  I understand that if any problems occur once I am at home I am to contact my physician. I understand and acknowledge receipt of the instructions indicated above.                                                                                                                                            Physician's or R.N.'s Signature                                                                  Date/Time                                                                                                                                              Patient or Representative Signature                                                          Date/Time

## 2019-06-19 NOTE — PROGRESS NOTES
Discharge instructions in Togolese given to patient and grandson. Prescriptions given to patient along with schedule for the [facility]. Grandson to provide ride home. IV lines removed. No further discharge needs at this time.

## 2019-06-19 NOTE — PROGRESS NOTES
[facility]    Medical Progress Note      NAME: Orlin Paniagua   :  1945  MRM:  [id number]    Date/Time: 2019  9:08 AM          Assessment and Plan:     Pneumonia / Cough / Abnormal chest x-ray - POA, unclear etiology. Hx of living in [facility].  I find no induration on her skin PPD. Negative pneumonia serologies. Viral panel positive for rhinovirus. Thus IS think viral URI caused her current fever/coughing episode involves. We treated her empirically 3 days with Ceftriaxone and Azithromycin for presumed bacterial PNA. Consulted pulm. They attempted bronchoscopy , but failed due to mass effect. They recommend outpatient ENT follow up. Neck mass - Present for years, unclear etiology. Never had a Bx. Consulted ENT. Bx today prior to DC, then follow up as outpatient with Dr Lana South. He kindly agrees to accept this uninsured patient.     Hypokalemia - Repleted. Follow     HTN (hypertension) - Per patient. Not on Meds. Unremarkable ECHO. A bit better BP and pulse after starting metoprolol. Dx on that. Sepsis / Leukocytosis / Fever / Sinus tachycardia - POA, due to rhinovirus vs PNA vs other. NOT severe sepsis. SIRS now resolved. Supportive care. Follow Cx.        Subjective:     Chief Complaint:  Awaiting Bx    ROS:  (bold if positive, if negative)    Tolerating PT  Tolerating Diet        Objective:     Last 24hrs VS reviewed since prior progress note.  Most recent are:    Visit Vitals  /61 (BP 1 Location: Right arm, BP Patient Position: At rest)   Pulse 80   Temp 98.2 °F (36.8 °C)   Resp 17   Ht 4' 11\" (1.499 m)   Wt 42.2 kg (93 lb)   SpO2 97%   BMI 18.78 kg/m²     SpO2 Readings from Last 6 Encounters:   19 97%    O2 Flow Rate (L/min): 2 l/min       Intake/Output Summary (Last 24 hours) at 2019 1035  Last data filed at 2019 0604  Gross per 24 hour   Intake 300 ml   Output    Net 300 ml        Physical Exam:    Gen:  Thin, frail, in no acute distress  HEENT:  Pink conjunctivae, PERRL, hearing intact to voice, moist mucous membranes  Neck:  Supple, large R mass, thyroid non-tender  Resp:  No accessory muscle use, clear breath sounds without wheezes rales or rhonchi  Card:  No murmurs, tachycardic S1, S2 distant thrills, bruits or peripheral edema  Abd:  Soft, non-tender, non-distended, normoactive bowel sounds are present, no mass  Lymph:  No cervical or inguinal adenopathy  Musc:  No cyanosis or clubbing  Skin:  No rashes or ulcers, skin turgor is good  Neuro:  Cranial nerves are grossly intact, no focal motor weakness, follows commands   Psych:   Moderate insight, oriented to person, place and time, alert    Telemetry reviewed:   normal sinus rhythm  __________________________________________________________________  Medications Reviewed: (see below)  Medications:     Current Facility-Administered Medications   Medication Dose Route Frequency    sodium chloride (NS) flush 5-40 mL  5-40 mL IntraVENous Q8H    sodium chloride (NS) flush 5-40 mL  5-40 mL IntraVENous PRN    sodium chloride (NS) flush 5-40 mL  5-40 mL IntraVENous Q8H    sodium chloride (NS) flush 5-40 mL  5-40 mL IntraVENous PRN    azithromycin (ZITHROMAX) 500 mg in 0.9% sodium chloride (MBP/ADV) 250 mL  500 mg IntraVENous Q24H    cefTRIAXone (ROCEPHIN) 2 g in 0.9% sodium chloride (MBP/ADV) 50 mL  2 g IntraVENous Q24H    metoprolol tartrate (LOPRESSOR) tablet 25 mg  25 mg Oral Q12H    sodium chloride (NS) flush 5-40 mL  5-40 mL IntraVENous Q8H    sodium chloride (NS) flush 5-40 mL  5-40 mL IntraVENous PRN    zolpidem (AMBIEN) tablet 5 mg  5 mg Oral QHS PRN    acetaminophen (TYLENOL) tablet 650 mg  650 mg Oral Q4H PRN    diphenhydrAMINE (BENADRYL) capsule 25 mg  25 mg Oral Q4H PRN    ondansetron (ZOFRAN) injection 4 mg  4 mg IntraVENous Q4H PRN    enoxaparin (LOVENOX) injection 30 mg  30 mg SubCUTAneous Q24H        Lab Data Reviewed: (see below)  Lab Review:     Recent Labs 06/17/19 0220   WBC 8.6   HGB 11.6   HCT 36.7        Recent Labs     06/17/19 0220      K 4.0   *   CO2 28   *   BUN 15   CREA 0.68   CA 8.2*   MG 2.4     No results found for: GLUCPOC  No results for input(s): PH, PCO2, PO2, HCO3, FIO2 in the last 72 hours. No results for input(s): INR in the last 72 hours.     No lab exists for component: Nikole Duty  All Micro Results     Procedure Component Value Units Date/Time    CULTURE, BLOOD [[id number]] Collected:  06/16/19 0558    Order Status:  Completed Specimen:  Blood Updated:  06/19/19 0556     Special Requests: NO SPECIAL REQUESTS        Culture result: NO GROWTH 3 DAYS       AFB CULTURE + SMEAR W/RFLX ID FROM CULTURE [[id number]] Collected:  06/16/19 1750    Order Status:  Completed Specimen:  Miscellaneous sample Updated:  06/18/19 1735     Source SPUTUM        AFB Specimen processing Concentration     Acid Fast Smear NEGATIVE         Comment: (NOTE)  Performed At: [facility]  [employer] [id number]  Paty Andrade MD NO:[id number]          Acid Fast Culture PENDING    AFB CULTURE + SMEAR W/RFLX ID FROM CULTURE [[id number]] Collected:  06/16/19 0956    Order Status:  Completed Specimen:  Miscellaneous sample Updated:  06/18/19 1735     Source SPUTUM        AFB Specimen processing Concentration     Acid Fast Smear NEGATIVE         Comment: (NOTE)  Performed At: [facility]  "[employer] [address] [place] [id number]  Paty Andrade MD JA:[id number]          Acid Fast Culture PENDING    AFB CULTURE + SMEAR W/RFLX ID FROM CULTURE [[id number]] Collected:  06/17/19 0220    Order Status:  Completed Specimen:  Miscellaneous sample Updated:  06/18/19 1735     Source SPUTUM        AFB Specimen processing Concentration     Acid Fast Smear NEGATIVE         Comment: (NOTE)  Performed At: [facility]  [employer] [facility] [facility] [address] [place] [id number]  Paty Andrade MD SL:[id number]          Acid Fast Culture PENDING    RAJESH Vieyra, UR/CSF [[id number]] Collected:  06/16/19 1617    Order Status:  Completed Specimen:  Other from Miscellaneous sample Updated:  06/18/19 1636     Source URINE        Specimen Urine     Streptococcus pneumoniae Ag NEGATIVE         Fluid culture Not Indicated     Organism ID Not indicated. Please note Comment        Comment: (NOTE)  College of American Pathologists standards require a culture to be  performed on CSF specimens submitted for bacterial antigen testing. (CAP [id number]) Urine specimens will not be cultured. Performed At: [facility]  [facility] [address] [id number]  Gabriel Shankar MD TS:[id number]         Omid Shirley [[id number]] Collected:  06/16/19 1617    Order Status:  Completed Specimen:  Urine Updated:  06/18/19 1636     Source URINE        L pneumophila S1 Ag, urine NEGATIVE         Comment: (NOTE)  Presumptive negative for L. pneumophila serogroup 1 antigen in urine,  suggesting no recent or current infection. Legionnaires' disease  cannot be ruled out since other serogroups and species may also  cause disease.   Performed At: [facility]  [place] [address] [id number]  Gabriel Sahnkar MD PU:[id number]         Aliyah Guerrero 20 [[id number]]  (Abnormal) Collected:  06/16/19 0953    Order Status:  Completed Specimen:  Nasopharyngeal Updated:  06/16/19 1412     Adenovirus NOT DETECTED        Coronavirus 229E NOT DETECTED        Coronavirus HKU1 NOT DETECTED        Coronavirus CVNL63 NOT DETECTED        Coronavirus OC43 NOT DETECTED        Metapneumovirus NOT DETECTED        Rhinovirus and Enterovirus DETECTED        Influenza A NOT DETECTED        Influenza A, subtype H1 NOT DETECTED        Influenza A, subtype H3 NOT DETECTED        INFLUENZA A H1N1 PCR NOT DETECTED        Influenza B NOT DETECTED        Parainfluenza 1 NOT DETECTED        Parainfluenza 2 NOT DETECTED        Parainfluenza 3 NOT DETECTED        Parainfluenza virus 4 NOT DETECTED        RSV by PCR NOT DETECTED        Bordetella pertussis - PCR NOT DETECTED        Chlamydophila pneumoniae DNA, QL, PCR NOT DETECTED        Mycoplasma pneumoniae DNA, QL, PCR NOT DETECTED       LEGIONELLA PNEUMOPHILA AG, URINE [[id number]]     Order Status:  Canceled Specimen:  Urine     RAJESH Vogel, UR/CSF [[id number]]     Order Status:  Canceled Specimen:  Other     URINE CULTURE HOLD SAMPLE [[id number]] Collected:  06/16/19 0634    Order Status:  Completed Specimen:  Urine from Serum Updated:  06/16/19 0653     Urine culture hold       URINE ON HOLD IN MICROBIOLOGY DEPT FOR 3 DAYS. IF UNPRESERVED URINE IS SUBMITTED, IT CANNOT BE USED FOR ADDITIONAL TESTING AFTER 24 HRS, RECOLLECTION WILL BE REQUIRED. Other pertinent lab: none    Total time spent with patient: 39 Minutes I reviewed chart, notes, data and current medications in the medical record. I have examined and treated the patient at bedside during this period.                  Care Plan discussed with: Patient, Family, Care Manager, Nursing Staff, Consultant/Specialist and >50% of time spent in counseling and coordination of care    Discussed:  Care Plan and D/C Planning    Prophylaxis:  Hep SQ and H2B/PPI    Disposition:  Home w/Family           ___________________________________________________    Attending Physician: Talia Mesa MD

## 2019-06-19 NOTE — PROGRESS NOTES
6/19/2019   CARE MANAGEMENT NOTE:  CM reviewed EMR for clinical updates. Pt had unsuccessful bronch attempt yesterday. Also, seen by ENT for neck mass. AFB cx x 3 are negative.     Transition of Care Plan:  1.  Home with family - grandson, Fernie Card ([phone]) is the contact. 2.  Pt is uninsured. Bryanna Rios is aware and will assess pt for Medicaid eligibility. 3.  Community resources ie Seth Andrews will be provided to pt.  She was already given a PCP list.     CM will continue to follow pt's hospital course.    Jody

## 2019-06-19 NOTE — DISCHARGE SUMMARY
Physician Discharge Summary     Patient ID:  Jean Marie Tinsley  [id number]  72 y.o.  1945    Admit date: 6/16/2019    Discharge date and time: 6/19/2019    Admission Diagnoses: Pneumonia [J18.9]    Discharge Diagnoses:    Principal Diagnosis   Pneumonia                                             Other Diagnoses    Neck mass ()    Hypokalemia (6/16/2019)    Leukocytosis (6/16/2019)    HTN (hypertension) (6/16/2019)    Abnormal chest x-ray (6/16/2019)    Sinus tachycardia (6/16/2019)    Sepsis ([address] (6/16/2019)    Acute bronchitis due to Rhinovirus (6/18/2019)     Hospital Course:  Pneumonia / Cough / Abnormal chest x-ray - POA, unclear etiology. Hx of living in [facility] 38 find no induration on her skin PPD. Negative pneumonia serologies.  Viral panel positive for rhinovirus. Thus IS think viral URI caused her current fever/coughing episode involves. We treated her empirically 3 days with Ceftriaxone and Azithromycin for presumed bacterial PNA.  Consulted pulm. They attempted bronchoscopy 6/17, but failed due to mass effect. They recommend outpatient ENT follow up.     Neck mass - Present for years, unclear etiology.  Never had a Bx.  Consulted ENT.  Bx today prior to DC, then follow up as outpatient with Dr Elyse Pond. He kindly agrees to accept this uninsured patient.     Hypokalemia - Repleted. Follow     HTN (hypertension) - Per patient. Not on Meds. Unremarkable ECHO.  A bit better BP and pulse after starting metoprolol. Dx on that.     Sepsis / Leukocytosis / Fever / Sinus tachycardia - POA, due to rhinovirus vs PNA vs other.  NOT severe sepsis. SIRS now resolved. Supportive care. Follow Cx.     PCP: None    Consults: Pulmonary/Intensive care and ENT    Significant Diagnostic Studies: See Hospital Course    Discharged home in improved condition.     Discharge Exam:  /61 (BP 1 Location: Right arm, BP Patient Position: At rest)   Pulse 80   Temp 98.2 °F (36.8 °C)   Resp 17   Ht 4' 11\" (1.499 m) Wt 42.2 kg (93 lb)   SpO2 97%   BMI 18.78 kg/m²      Gen:  Thin, frail, in no acute distress  HEENT:  Pink conjunctivae, PERRL, hearing intact to voice, moist mucous membranes  Neck:  Supple, large R mass, thyroid non-tender  Resp:  No accessory muscle use, clear breath sounds without wheezes rales or rhonchi  Card:  No murmurs, tachycardic S1, S2 distant thrills, bruits or peripheral edema  Abd:  Soft, non-tender, non-distended, normoactive bowel sounds are present, no mass  Lymph:  No cervical or inguinal adenopathy  Musc:  No cyanosis or clubbing  Skin:  No rashes or ulcers, skin turgor is good  Neuro:  Cranial nerves are grossly intact, no focal motor weakness, follows commands   Psych: Moderate insight, oriented to person, place and time, alert    Patient Instructions:   Current Discharge Medication List      START taking these medications    Details   metoprolol tartrate (LOPRESSOR) 25 mg tablet Take 1 Tab by mouth every twelve (12) hours for 30 days. Qty: 60 Tab, Refills: 0           Activity: Activity as tolerated  Diet: Regular Diet and Increase noncaffeinated fluids  Wound Care: None needed    Follow-up with ENT in 1 week.   Follow-up tests/labs - None    Signed:  Braeden Alvarez MD  6/19/2019  10:38 AM

## 2019-06-22 LAB
BACTERIA SPEC CULT: NORMAL
M TB IFN-G BLD-IMP: NEGATIVE
QUANTIFERON CRITERIA, QFI1T: NORMAL
QUANTIFERON MITOGEN VALUE: 1.47 IU/ML
QUANTIFERON NIL VALUE: 0.06 IU/ML
QUANTIFERON TB1 AG: 0.24 IU/ML
QUANTIFERON TB2 AG: 0.35 IU/ML
SERVICE CMNT-IMP: NORMAL

## 2019-07-30 LAB
ACID FAST STN SPEC: NEGATIVE
MYCOBACTERIUM SPEC QL CULT: NEGATIVE
SPECIMEN PREPARATION: NORMAL
SPECIMEN SOURCE: NORMAL

## 2019-08-09 LAB
ACID FAST STN SPEC: NEGATIVE
ACID FAST STN SPEC: NEGATIVE
MYCOBACTERIUM SPEC QL CULT: NEGATIVE
MYCOBACTERIUM SPEC QL CULT: NEGATIVE
SPECIMEN PREPARATION: NORMAL
SPECIMEN PREPARATION: NORMAL
SPECIMEN SOURCE: NORMAL
SPECIMEN SOURCE: NORMAL

## 2020-01-23 ENCOUNTER — HOSPITAL ENCOUNTER (OUTPATIENT)
Dept: PREADMISSION TESTING | Age: 75
Discharge: HOME | End: 2020-01-23
Payer: SELF-PAY

## 2020-01-23 VITALS
HEART RATE: 84 BPM | HEIGHT: 55 IN | RESPIRATION RATE: 16 BRPM | TEMPERATURE: 98.3 F | OXYGEN SATURATION: 99 % | DIASTOLIC BLOOD PRESSURE: 66 MMHG | BODY MASS INDEX: 20.71 KG/M2 | WEIGHT: 89.5 LBS | SYSTOLIC BLOOD PRESSURE: 149 MMHG

## 2020-01-23 LAB
ANION GAP SERPL CALC-SCNC: 6 MMOL/L (ref 5–15)
ATRIAL RATE: 78 BPM
BUN SERPL-MCNC: 13 MG/DL (ref 6–20)
BUN/CREAT SERPL: 21 (ref 12–20)
CALCIUM SERPL-MCNC: 8.7 MG/DL (ref 8.5–10.1)
CALCULATED P AXIS, ECG09: 64 DEGREES
CALCULATED R AXIS, ECG10: 75 DEGREES
CALCULATED T AXIS, ECG11: 28 DEGREES
CHLORIDE SERPL-SCNC: 105 MMOL/L (ref 97–108)
CO2 SERPL-SCNC: 29 MMOL/L (ref 21–32)
CREAT SERPL-MCNC: 0.62 MG/DL (ref 0.55–1.02)
DIAGNOSIS, 93000: NORMAL
GLUCOSE SERPL-MCNC: 87 MG/DL (ref 65–100)
P-R INTERVAL, ECG05: 118 MS
POTASSIUM SERPL-SCNC: 4.4 MMOL/L (ref 3.5–5.1)
Q-T INTERVAL, ECG07: 380 MS
QRS DURATION, ECG06: 68 MS
QTC CALCULATION (BEZET), ECG08: 433 MS
SODIUM SERPL-SCNC: 140 MMOL/L (ref 136–145)
VENTRICULAR RATE, ECG03: 78 BPM

## 2020-01-23 PROCEDURE — 93005 ELECTROCARDIOGRAM TRACING: CPT

## 2020-01-23 PROCEDURE — 36415 COLL VENOUS BLD VENIPUNCTURE: CPT

## 2020-01-23 PROCEDURE — 80048 BASIC METABOLIC PNL TOTAL CA: CPT

## 2020-01-23 NOTE — H&P
Preoperative Evaluation                     History and Physical with Surgical Risk Stratification     1/23/2020    CC: Right neck mass  Surgery: Excision of neck mass     HPI:   Kolby Mcarthur is a 69 y.o. female referred for pre-operative evaluation by Dr. Clay Flores for surgery on 1/30/20. Ms. Norma Irene is here with her grandson who is her . She has declined the  services at this time. She lives in [facility] and came back last week for her surgery. She notes she has had this mass for over 5 years. She states she has no pain or swallowing difficulties. She states she is very active at home. The patient was evaluated in the surgeon's office and it was determined that the most appropriate plan of care is to proceed with surgical intervention. Patient's PCP None    Review of Systems     Constitutional: Negative for chills and fever  HENT: Negative for congestion and sore throat. Positive for neck mass-right  Eyes: negative for blurred vision and double vision  Respiratory: Negative for cough, shortness of breath and wheezing  Mouth: Positive for poor dentition  Cardiovascular: Negative for chest pain and palpitations  Gastrointestinal: Negative for abdominal pain, constipation, diarrhea and nausea  Genitourinary: Negative for dysuria and hematuria  Musculoskeletal: Negative for joint pain  Skin: Negative for rash, open wounds. Negative for bruises easily  Neurological: Negative for dizziness, tremors and headaches  Psychiatric: Negative for depression. The patient is not nervous/anxious.     Inherent Risk of Surgery     Surgical risk:  Intermediate  Low:  EIntermediate:    head and neckHigh:    Patient Cardiac Risk Assessment     Revised Cardiac Risk Index (RCRI)    Rate if cardiac death, nonfatal MI, nonfatal cardiac arrest by number of risk factor- 0.4%    LARA/AHA 2007 Guidelines:   1) Surgery Emergency, Non-cardiac -> to surgery  2) If not, look at clinical predictors    Major Intermediate Minor   Abnormal EKG    Blood Thinner: ASA 81mg    METS      EQUAL TO 4 Care for self Walk indoors around house Walk 2-3 blocks on level ground (2-3 mph) Light work around house (dust, dishes)     Other Risk Factors:   Screening for ETOH use:  Done and low risk  Smoking status:   None    Personal or FH of bleeding problems:  No  Personal or FH of blood clots:  No  Personal or FH of anesthesia problems:   No    Pulmonary Risk:  Asthma or COPD:  No  Body mass index is 21.19 kg/m². Known BREANNE:  No  BUN normal    Past Medical, Surgical, Social History     Allergies: No Known Allergies    Patient did not bring in medication list/bottles to review. Medication Documentation Review Audit     Reviewed by Alyce Crawford RN (Registered Nurse) on 01/23/20 at 1316    Medication Sig Documenting Provider Last Dose Status Taking?   aspirin delayed-release 81 mg tablet Take  by mouth daily. Provider, Historical  Active Yes   OTHER BP med. Name to be called to PAT Provider, Historical  Active Yes              Past Medical History:   Diagnosis Date    HTN (hypertension)     Neck mass      History reviewed. No pertinent surgical history.      Social History     Tobacco Use    Smoking status: Never Smoker    Smokeless tobacco: Never Used   Substance Use Topics    Alcohol use: Never     Frequency: Never    Drug use: Never     Family History   Problem Relation Age of Onset    Deep Vein Thrombosis Neg Hx     Anesth Problems Neg Hx        Objective     Vitals:    01/23/20 1308   BP: 149/66   Pulse: 84   Resp: 16   Temp: 98.3 °F (36.8 °C)   SpO2: 99%   Weight: 40.6 kg (89 lb 8 oz)   Height: 4' 6.5\" (1.384 m)       Constitutional:  Appears well,  No Acute Distress, Vitals noted  Psychiatric:   Affect normal, Alert and Oriented to person/place/time    Eyes:   Pupils equally round and reactive, EOMI, conjunctiva clear, eyelids normal  ENT:   External ears and nose normal/lips, teeth normal, gums normal, TMs and Orophyarynx normal  Neck:   General inspection and Thyroid abnormal.  No abnormal cervical or supraclavicular nodes    Lungs:   Clear to auscultation, good respiratory effort  Heart: Ausculation normal.  Regular rhythm. No cardiac murmurs. No carotid bruits or palpable thrills  Chest wall normal  Musculoskeletal: Normal gait  Extremities:   Without edema, good peripheral pulses  Skin:   Warm to palpation, without rashes, bruising, or suspicious lesions     See CC    Assessment and Plan     Assessment/Plan:   1) Right neck mass  2) Pre-Operative Evaluation    Lab and EKG reviewed. Preoperative Clearance  Per RCRI, the patient has a 0.4% risk of cardiac death, nonfatal MI, nonfatal cardiac arrest based on no risk factors. Per ACC/AHA guidelines, patient is low risk for a(n) intermediate risk surgery and may proceed to planned surgery with the above noted risk.     Sakina Gupta NP

## 2020-01-23 NOTE — PERIOP NOTES
[address] , [address] [facility]   MAIN OR                                  [phone]   MAIN PRE OP                          [phone]                                                                                AMBULATORY PRE OP          [phone]  PRE-ADMISSION TESTING    [phone]   Surgery Date:   Thursday, January 30th       Is surgery arrival time given by surgeon? NO  If NO, [facility] staff will call you between 3 and 7pm the day before your surgery with your arrival time. (If your surgery is on a Monday, we will call you the Friday before.)    Call [phone] after 7pm Monday-Friday if you did not receive this call. INSTRUCTIONS BEFORE YOUR SURGERY   When You  Arrive Arrive at the [address] [facility] on the day of your surgery  Have your insurance card, photo ID, and any copayment (if needed)   Food   and   Drink NO food or drink after midnight the night before surgery    This means NO water, gum, mints, coffee, juice, etc.  No alcohol (beer, wine, liquor) 24 hours before and after surgery   Medications to   TAKE   Morning of Surgery MEDICATIONS TO TAKE THE MORNING OF SURGERY WITH A SIP OF WATER:    PLEASE CALL WITH THE NAME/DOSEOF BLOOD PRESSURE MEDICINE AND DOSE OF ASPIRIN.  [phone]   Medications  To  STOP      7 days before surgery  Non-Steroidal anti-inflammatory Drugs (NSAID's): for example, Ibuprofen (Advil, Motrin), Naproxen (Aleve)   Aspirin, if taking for pain    Herbal supplements, vitamins, and fish oil   Other:  (Pain medications not listed above, including Tylenol may be taken)   Blood  Thinners  If you take  Aspirin, Plavix, Coumadin, or any blood-thinning or anti-blood clot medicine, talk to the doctor who prescribed the medications for pre-operative instructions.    Bathing Clothing  Jewelry  Valuables      If you shower the morning of surgery, please do not apply anything to your skin (lotions, powders, deodorant, or makeup, especially mascara)   Follow Chlorhexidine Care Fusion body wash instructions provided to you during PAT appointment. Begin 3 days prior to surgery.  Do not shave or trim anywhere 24 hours before surgery   Wear your hair loose or down; no pony-tails, buns, or metal hair clips   Wear loose, comfortable, clean clothes   Wear glasses instead of contacts   Leave money, valuables, and jewelry, including body piercings, at home   Going Home - or Spending the Night  SAME-DAY SURGERY: You must have a responsible adult drive you home and stay with you 24 hours after surgery     Special Instructions FREE  PARKING     Follow all instructions so your surgery wont be cancelled. Please, be on time. If a situation occurs and you are delayed the day of surgery, call [phone] or [phone]. If your physical condition changes (like a fever, cold, flu, etc.) call your surgeon. Home medication(s) reviewed and verified TO BE VERIFIED/PHONE during PAT appointment. The patient was contacted  in person. WITH GRANDSON/INTERPRETING AND LANGUAGE LINE VERIFICATION OF UNDERSTANDING OF CONSENT AND PREOP INSTRUCTION. The patient verbalizes understanding of all instructions and does  need reinforcement.

## 2020-01-28 ENCOUNTER — ANESTHESIA EVENT (OUTPATIENT)
Dept: SURGERY | Age: 75
End: 2020-01-28
Payer: SELF-PAY

## 2020-01-29 RX ORDER — CAPTOPRIL 25 MG/1
TABLET ORAL
COMMUNITY

## 2020-01-29 NOTE — PROGRESS NOTES
Called roxy, Narendra Avila, to ask if he had learned the name of the antihypertensive pt takes. Left VM requesting he call back w/that information [phone] before 1600 & [phone] after.

## 2020-01-29 NOTE — PROGRESS NOTES
Ed returned call to update MAR. He states pt is not taking captopril but is not sure when she stopped. I explained that she could take it as normal today, but to hold before coming for surgery tomorrow. He verbalized understanding.

## 2020-01-30 ENCOUNTER — ANESTHESIA (OUTPATIENT)
Dept: SURGERY | Age: 75
End: 2020-01-30
Payer: SELF-PAY

## 2020-01-30 ENCOUNTER — HOSPITAL ENCOUNTER (OUTPATIENT)
Age: 75
Setting detail: OUTPATIENT SURGERY
Discharge: HOME | End: 2020-01-30
Attending: SPECIALIST | Admitting: SPECIALIST
Payer: SELF-PAY

## 2020-01-30 VITALS
RESPIRATION RATE: 19 BRPM | HEART RATE: 89 BPM | DIASTOLIC BLOOD PRESSURE: 83 MMHG | BODY MASS INDEX: 21.17 KG/M2 | OXYGEN SATURATION: 97 % | TEMPERATURE: 98.7 F | HEIGHT: 55 IN | WEIGHT: 91.49 LBS | SYSTOLIC BLOOD PRESSURE: 151 MMHG

## 2020-01-30 PROCEDURE — 77030018836 HC SOL IRR NACL ICUM -A: Performed by: SPECIALIST

## 2020-01-30 PROCEDURE — 74011000250 HC RX REV CODE- 250: Performed by: SPECIALIST

## 2020-01-30 PROCEDURE — 74011000250 HC RX REV CODE- 250: Performed by: NURSE ANESTHETIST, CERTIFIED REGISTERED

## 2020-01-30 PROCEDURE — 77030010507 HC ADH SKN DERMBND J&J -B: Performed by: SPECIALIST

## 2020-01-30 PROCEDURE — 74011250636 HC RX REV CODE- 250/636: Performed by: ANESTHESIOLOGY

## 2020-01-30 PROCEDURE — 77030040361 HC SLV COMPR DVT MDII -B

## 2020-01-30 PROCEDURE — 76060000064 HC AMB SURG ANES 2 TO 2.5 HR: Performed by: SPECIALIST

## 2020-01-30 PROCEDURE — 77030032060 HC PWDR HEMSTAT ARISTA ASRB 3GM BARD -C: Performed by: SPECIALIST

## 2020-01-30 PROCEDURE — 76210000046 HC AMBSU PH II REC FIRST 0.5 HR: Performed by: SPECIALIST

## 2020-01-30 PROCEDURE — 77030008684 HC TU ET CUF COVD -B: Performed by: NURSE ANESTHETIST, CERTIFIED REGISTERED

## 2020-01-30 PROCEDURE — 76030000004 HC AMB SURG OR TIME 2 TO 2.5: Performed by: SPECIALIST

## 2020-01-30 PROCEDURE — 76210000035 HC AMBSU PH I REC 1 TO 1.5 HR: Performed by: SPECIALIST

## 2020-01-30 PROCEDURE — 88305 TISSUE EXAM BY PATHOLOGIST: CPT

## 2020-01-30 PROCEDURE — 77030040356 HC CORD BPLR FRCP COVD -A: Performed by: SPECIALIST

## 2020-01-30 PROCEDURE — 77030019908 HC STETH ESOPH SIMS -A: Performed by: NURSE ANESTHETIST, CERTIFIED REGISTERED

## 2020-01-30 PROCEDURE — 77030002933 HC SUT MCRYL J&J -A: Performed by: SPECIALIST

## 2020-01-30 PROCEDURE — 74011250636 HC RX REV CODE- 250/636: Performed by: NURSE ANESTHETIST, CERTIFIED REGISTERED

## 2020-01-30 PROCEDURE — 77030040922 HC BLNKT HYPOTHRM STRY -A

## 2020-01-30 PROCEDURE — 77030026438 HC STYL ET INTUB CARD -A: Performed by: NURSE ANESTHETIST, CERTIFIED REGISTERED

## 2020-01-30 PROCEDURE — 77030013629 HC ELECTRD NDL STRY -B: Performed by: SPECIALIST

## 2020-01-30 PROCEDURE — 77030002996 HC SUT SLK J&J -A: Performed by: SPECIALIST

## 2020-01-30 PROCEDURE — 77030002888 HC SUT CHRMC J&J -A: Performed by: SPECIALIST

## 2020-01-30 RX ORDER — ONDANSETRON 2 MG/ML
INJECTION INTRAMUSCULAR; INTRAVENOUS AS NEEDED
Status: DISCONTINUED | OUTPATIENT
Start: 2020-01-30 | End: 2020-01-30 | Stop reason: HOSPADM

## 2020-01-30 RX ORDER — NALOXONE HYDROCHLORIDE 0.4 MG/ML
0.04 INJECTION, SOLUTION INTRAMUSCULAR; INTRAVENOUS; SUBCUTANEOUS
Status: DISCONTINUED | OUTPATIENT
Start: 2020-01-30 | End: 2020-01-30 | Stop reason: HOSPADM

## 2020-01-30 RX ORDER — PROPOFOL 10 MG/ML
INJECTION, EMULSION INTRAVENOUS AS NEEDED
Status: DISCONTINUED | OUTPATIENT
Start: 2020-01-30 | End: 2020-01-30 | Stop reason: HOSPADM

## 2020-01-30 RX ORDER — DIPHENHYDRAMINE HYDROCHLORIDE 50 MG/ML
12.5 INJECTION, SOLUTION INTRAMUSCULAR; INTRAVENOUS AS NEEDED
Status: DISCONTINUED | OUTPATIENT
Start: 2020-01-30 | End: 2020-01-30 | Stop reason: HOSPADM

## 2020-01-30 RX ORDER — ONDANSETRON 2 MG/ML
4 INJECTION INTRAMUSCULAR; INTRAVENOUS AS NEEDED
Status: DISCONTINUED | OUTPATIENT
Start: 2020-01-30 | End: 2020-01-30 | Stop reason: HOSPADM

## 2020-01-30 RX ORDER — HYDROMORPHONE HYDROCHLORIDE 1 MG/ML
.25-1 INJECTION, SOLUTION INTRAMUSCULAR; INTRAVENOUS; SUBCUTANEOUS
Status: DISCONTINUED | OUTPATIENT
Start: 2020-01-30 | End: 2020-01-30 | Stop reason: HOSPADM

## 2020-01-30 RX ORDER — BUPIVACAINE HYDROCHLORIDE AND EPINEPHRINE 5; 5 MG/ML; UG/ML
INJECTION, SOLUTION EPIDURAL; INTRACAUDAL; PERINEURAL AS NEEDED
Status: DISCONTINUED | OUTPATIENT
Start: 2020-01-30 | End: 2020-01-30 | Stop reason: HOSPADM

## 2020-01-30 RX ORDER — FLUMAZENIL 0.1 MG/ML
0.2 INJECTION INTRAVENOUS
Status: DISCONTINUED | OUTPATIENT
Start: 2020-01-30 | End: 2020-01-30 | Stop reason: HOSPADM

## 2020-01-30 RX ORDER — ALBUTEROL SULFATE 0.83 MG/ML
2.5 SOLUTION RESPIRATORY (INHALATION) AS NEEDED
Status: DISCONTINUED | OUTPATIENT
Start: 2020-01-30 | End: 2020-01-30 | Stop reason: HOSPADM

## 2020-01-30 RX ORDER — LIDOCAINE HYDROCHLORIDE 10 MG/ML
0.1 INJECTION, SOLUTION EPIDURAL; INFILTRATION; INTRACAUDAL; PERINEURAL AS NEEDED
Status: DISCONTINUED | OUTPATIENT
Start: 2020-01-30 | End: 2020-01-30 | Stop reason: HOSPADM

## 2020-01-30 RX ORDER — FENTANYL CITRATE 50 UG/ML
25 INJECTION, SOLUTION INTRAMUSCULAR; INTRAVENOUS
Status: DISCONTINUED | OUTPATIENT
Start: 2020-01-30 | End: 2020-01-30 | Stop reason: HOSPADM

## 2020-01-30 RX ORDER — PHENYLEPHRINE HCL IN 0.9% NACL 0.4MG/10ML
SYRINGE (ML) INTRAVENOUS AS NEEDED
Status: DISCONTINUED | OUTPATIENT
Start: 2020-01-30 | End: 2020-01-30 | Stop reason: HOSPADM

## 2020-01-30 RX ORDER — ROCURONIUM BROMIDE 10 MG/ML
INJECTION, SOLUTION INTRAVENOUS AS NEEDED
Status: DISCONTINUED | OUTPATIENT
Start: 2020-01-30 | End: 2020-01-30 | Stop reason: HOSPADM

## 2020-01-30 RX ORDER — SODIUM CHLORIDE 0.9 % (FLUSH) 0.9 %
5-40 SYRINGE (ML) INJECTION AS NEEDED
Status: DISCONTINUED | OUTPATIENT
Start: 2020-01-30 | End: 2020-01-30 | Stop reason: HOSPADM

## 2020-01-30 RX ORDER — DEXAMETHASONE SODIUM PHOSPHATE 4 MG/ML
INJECTION, SOLUTION INTRA-ARTICULAR; INTRALESIONAL; INTRAMUSCULAR; INTRAVENOUS; SOFT TISSUE AS NEEDED
Status: DISCONTINUED | OUTPATIENT
Start: 2020-01-30 | End: 2020-01-30 | Stop reason: HOSPADM

## 2020-01-30 RX ORDER — SODIUM CHLORIDE, SODIUM LACTATE, POTASSIUM CHLORIDE, CALCIUM CHLORIDE 600; 310; 30; 20 MG/100ML; MG/100ML; MG/100ML; MG/100ML
125 INJECTION, SOLUTION INTRAVENOUS CONTINUOUS
Status: DISCONTINUED | OUTPATIENT
Start: 2020-01-30 | End: 2020-01-30 | Stop reason: HOSPADM

## 2020-01-30 RX ORDER — LIDOCAINE HYDROCHLORIDE 20 MG/ML
INJECTION, SOLUTION EPIDURAL; INFILTRATION; INTRACAUDAL; PERINEURAL AS NEEDED
Status: DISCONTINUED | OUTPATIENT
Start: 2020-01-30 | End: 2020-01-30 | Stop reason: HOSPADM

## 2020-01-30 RX ORDER — FENTANYL CITRATE 50 UG/ML
INJECTION, SOLUTION INTRAMUSCULAR; INTRAVENOUS AS NEEDED
Status: DISCONTINUED | OUTPATIENT
Start: 2020-01-30 | End: 2020-01-30 | Stop reason: HOSPADM

## 2020-01-30 RX ORDER — SODIUM CHLORIDE 0.9 % (FLUSH) 0.9 %
5-40 SYRINGE (ML) INJECTION EVERY 8 HOURS
Status: DISCONTINUED | OUTPATIENT
Start: 2020-01-30 | End: 2020-01-30 | Stop reason: HOSPADM

## 2020-01-30 RX ORDER — MIDAZOLAM HYDROCHLORIDE 1 MG/ML
INJECTION, SOLUTION INTRAMUSCULAR; INTRAVENOUS AS NEEDED
Status: DISCONTINUED | OUTPATIENT
Start: 2020-01-30 | End: 2020-01-30 | Stop reason: HOSPADM

## 2020-01-30 RX ORDER — SUCCINYLCHOLINE CHLORIDE 20 MG/ML
INJECTION INTRAMUSCULAR; INTRAVENOUS AS NEEDED
Status: DISCONTINUED | OUTPATIENT
Start: 2020-01-30 | End: 2020-01-30 | Stop reason: HOSPADM

## 2020-01-30 RX ADMIN — SODIUM CHLORIDE, SODIUM LACTATE, POTASSIUM CHLORIDE, AND CALCIUM CHLORIDE 125 ML/HR: 600; 310; 30; 20 INJECTION, SOLUTION INTRAVENOUS at 10:11

## 2020-01-30 RX ADMIN — SUCCINYLCHOLINE CHLORIDE 80 MG: 20 INJECTION, SOLUTION INTRAMUSCULAR; INTRAVENOUS; PARENTERAL at 07:34

## 2020-01-30 RX ADMIN — PHENYLEPHRINE HYDROCHLORIDE 10 MCG/MIN: 10 INJECTION INTRAVENOUS at 08:30

## 2020-01-30 RX ADMIN — Medication 40 MCG: at 08:24

## 2020-01-30 RX ADMIN — ROCURONIUM BROMIDE 5 MG: 50 INJECTION, SOLUTION INTRAVENOUS at 07:34

## 2020-01-30 RX ADMIN — PROPOFOL 80 MG: 10 INJECTION, EMULSION INTRAVENOUS at 07:34

## 2020-01-30 RX ADMIN — FENTANYL CITRATE 50 MCG: 50 INJECTION, SOLUTION INTRAMUSCULAR; INTRAVENOUS at 07:42

## 2020-01-30 RX ADMIN — DEXAMETHASONE SODIUM PHOSPHATE 4 MG: 4 INJECTION, SOLUTION INTRAMUSCULAR; INTRAVENOUS at 07:44

## 2020-01-30 RX ADMIN — FENTANYL CITRATE 50 MCG: 50 INJECTION, SOLUTION INTRAMUSCULAR; INTRAVENOUS at 07:33

## 2020-01-30 RX ADMIN — FENTANYL CITRATE 25 MCG: 50 INJECTION, SOLUTION INTRAMUSCULAR; INTRAVENOUS at 08:53

## 2020-01-30 RX ADMIN — FENTANYL CITRATE 25 MCG: 50 INJECTION, SOLUTION INTRAMUSCULAR; INTRAVENOUS at 09:50

## 2020-01-30 RX ADMIN — MIDAZOLAM 1 MG: 1 INJECTION INTRAMUSCULAR; INTRAVENOUS at 07:31

## 2020-01-30 RX ADMIN — LIDOCAINE HYDROCHLORIDE 40 MG: 20 INJECTION, SOLUTION INTRAVENOUS at 07:34

## 2020-01-30 RX ADMIN — MIDAZOLAM 1 MG: 1 INJECTION INTRAMUSCULAR; INTRAVENOUS at 07:30

## 2020-01-30 RX ADMIN — FENTANYL CITRATE 25 MCG: 50 INJECTION, SOLUTION INTRAMUSCULAR; INTRAVENOUS at 09:54

## 2020-01-30 RX ADMIN — Medication 40 MCG: at 08:17

## 2020-01-30 RX ADMIN — ROCURONIUM BROMIDE 15 MG: 50 INJECTION, SOLUTION INTRAVENOUS at 07:44

## 2020-01-30 RX ADMIN — ONDANSETRON 4 MG: 2 INJECTION INTRAMUSCULAR; INTRAVENOUS at 07:44

## 2020-01-30 RX ADMIN — SODIUM CHLORIDE, POTASSIUM CHLORIDE, SODIUM LACTATE AND CALCIUM CHLORIDE: 600; 310; 30; 20 INJECTION, SOLUTION INTRAVENOUS at 06:56

## 2020-01-30 RX ADMIN — FENTANYL CITRATE 25 MCG: 50 INJECTION, SOLUTION INTRAMUSCULAR; INTRAVENOUS at 09:31

## 2020-01-30 NOTE — BRIEF OP NOTE
BRIEF OPERATIVE NOTE    Date of Procedure: 1/30/2020   Preoperative Diagnosis: LOCALIZED SWELLING, MASS AND LUMP, NECK  Postoperative Diagnosis: LOCALIZED SWELLING, MASS AND LUMP, NECK    Procedure(s):  EXCISION OF RIGHT NECK MASS  Surgeon(s) and Role:     Varsha Kay MD - Primary         Surgical Assistant: Sheela Stephens    Surgical Staff:  Circ-1: Milena Gutierrez RN  Circ-Relief: Lise Solis RN-1: Joanna Metz RN  Surg Asst-1: Marci STERLING  Surg Asst-2: Elena GILMAN  Event Time In Time Out   Incision Start 0803    Incision Close 6512      Anesthesia: General   Estimated Blood Loss: 5 cc  Specimens:   ID Type Source Tests Collected by Time Destination   1 : right lateral neck mass Preservative Neck  Erick Mena MD 1/30/2020 0907 Pathology      Findings: Large cystic mass right level extending from right level III to level IV region, lateral but abutting internal jugular vein. Mass completely excised. Pathology pending. Complications: None.   Implants: * No implants in log *

## 2020-01-30 NOTE — DISCHARGE INSTRUCTIONS
[facility]    Head and Neck Post Operative Instructions    1. DIET  Start a soft diet and progress to usual diet as tolerated, unless otherwise directed. It is important to remember that good overall diet and health promotes healing. 2.  ACTIVITY  No heavy exertion or heavy lifting for about 10 days. Light activities are permitted but, avoid any stretching or bending of the neck for 10 days. 3. PAIN CONTROL  Pain should be mild to moderate throughout the recovery course. Of course, pain can vary greatly and is often dependent on the patient and type of procedure. If the pain is mild to moderate, alternating between acetaminophen (Tylenol) and ibuprofen (Motrin, Advil) is often effective. Alternating every 3-4 hours can be quite effective at pain control. Typically, a prescription pain medication such as hydrocodone (Lortab, Norco) or oxycodone (Percocet) is prescribed. These medications generally have acetaminophen added so must not take Tylenol simultaneously. 4.  WOUND CARE  A. Apply antibiotic ointment twice daily to suture or staple sites and continue for 2-3 days following removal.  B. It may be helpful to apply an ice pack (covered with thin towel or pillow case) to surgical site several times a day for 5-7 days to help reduce pain and swelling. C. If Dermabond (skin glue) was placed, there is no need to apply any antibiotic ointment until the Dermabond is removed at the first postoperative office visit. D. May shower or bathe following surgery but make sure to apply antibiotic ointment prior to doing so and do not submerge the area in water. E. If you have a paper tape dressing, make every attempt to keep it dry until it is removed. 5. THINGS TO BE CONCERNED ABOUT  Please call the office for any of these changes  A. Increasing pain  B. New red discoloration  C. New drainage of any description  D.  Increasing warmth of wound  E. Persistent fevers, even low-grade. 6.  DRAIN MANAGEMENT  A. Follow nursing instructions  B. Generally, your doctor will plan to have drains removed between 1 and 3 days. 7. LONG-TERM WOUND HEALING  A. Expect the wound to have a slight ridge for up to 6 weeks. This is usually by design for optimal wound healing. B. Local numbness is usual around wound sites, and can last up to and beyond 3 months. C. To minimize the prolonged redness or pigmentation around a wound, we recommend use of sunscreen for 3 months or longer on healed wounds. If you have any questions or concerns following your surgery, do not hesitate to contact our office at [phone]. [facility] office hours are 8:00 a.m. to 5:00 p.m (M-F). You should be able to reach us after hours by calling the regular office number. You can leave a message with our answering service and they will page the doctor on call. DISCHARGE SUMMARY from your Nurse    The following personal items collected during your admission are returned to you:   Dental Appliance: Dental Appliances: None  Vision: Visual Aid: Glasses  Hearing Aid:    Jewelry: Jewelry: None  Clothing: Clothing: Footwear, Pants, Shirt, Undergarments  Other Valuables: Other Valuables: None  Valuables sent to safe:      PATIENT INSTRUCTIONS:    After general anesthesia or intravenous sedation, for 24 hours or while taking prescription Narcotics:  · Limit your activities  · Do not drive and operate hazardous machinery  · Do not make important personal or business decisions  · Do  not drink alcoholic beverages  · If you have not urinated within 8 hours after discharge, please contact your surgeon on call.     Report the following to your surgeon:  · Excessive pain, swelling, redness or odor of or around the surgical area  · Temperature over 100.5  · Nausea and vomiting lasting longer than 4 hours or if unable to take medications  · Any signs of decreased circulation or nerve impairment to extremity: change in color, persistent  numbness, tingling, coldness or increase pain  · Any questions    COUGH AND DEEP BREATHE    Breathing deep and coughing are very important exercises to do after surgery. Deep breathing and coughing open the little air tubes and air sacks in your lungs. You take deep breaths every day. You may not even notice - it is just something you do when you sigh or yawn. It is a natural exercise you do to keep these air passages open. After surgery, take deep breaths and cough, on purpose. Coughing and deep breathing help prevent bronchitis and pneumonia after surgery. If you had chest or belly surgery, use a pillow as a \"hug fei\" and hold it tightly to your chest or belly when you cough. DIRECTIONS:  6. Take 10 to 15 slow deep breaths every hour while awake. 7. Breathe in deeply, and hold it for 2 seconds. 8. Exhale slowly through puckered lips, like blowing up a balloon. 9. After every 4th or 5th deep breath, hug your pillow to your chest or belly and give a hard, deep cough. Yes, it will probably hurt. But doing this exercise is very important part of healing after surgery. Take your pain medicine to help you do this exercise without too much pain. IF YOU HAVE BEEN DIAGNOSED WITH SLEEP APNEA, PLEASE USE YOUR SLEEP APNEA DEVICE OR CPAP MACHINE WHEN YOU INTEND TO NAP AFTER TAKING PAIN MEDICATION. Ankle Pumps    Ankle pumps increase the circulation of oxygenated blood to your lower extremities and decrease your risk for circulation problems such as blood clots. They also stretch the muscles, tendons and ligaments in your foot and ankle, and prevent joint contracture in the ankle and foot, especially after surgeries on the legs. It is important to do ankle pump exercises regularly after surgery because immobility increases your risk for developing a blood clot. Your doctor may also have you take an Aspirin for the next few days as well.     If your doctor did not ask you to take an Aspirin, consult with him before starting Aspirin therapy on your own. Slowly point your foot forward, feeling the muscles on the top of your lower leg stretch, and hold this position for 5 seconds. Next, pull your foot back toward you as far as possible, stretching the calf muscles, and hold that position for 5 seconds. Repeat with the other foot. Perform 10 repetitions every hour while awake for both ankles if possible (down and then up with the foot once is one repetition). You should feel gentle stretching of the muscles in your lower leg when doing this exercise. If you feel pain, or your range of motion is limited, don't  Push too hard. Only go the limit your joint and muscles will let you go. If you have increasing pain, progressively worsening leg warmth or swelling, STOP the exercise and call your doctor. Below is information about the medications your doctor is prescribing after your visit:    Other information in your discharge envelope:  []     PRESCRIPTIONS  []     PHYSICAL THERAPY PRESCRIPTION  []     APPOINTMENT CARDS  []     Regional Anesthesia Pamphlet for block or block with On-Q Catheter from Anesthesia Service  []     Medical device information sheets/pamphlets from their    []     School/work excuse note. []     /parent work excuse note. These are general instructions for a healthy lifestyle:    *  Please give a list of your current medications to your Primary Care Provider. *  Please update this list whenever your medications are discontinued, doses are      changed, or new medications (including over-the-counter products) are added. *  Please carry medication information at all times in case of emergency situations.     About Smoking  No smoking / No tobacco products / Avoid exposure to second hand smoke    Surgeon General's Warning:  Quitting smoking now greatly reduces serious risk to your health. Obesity, smoking, and sedentary lifestyle greatly increases your risk for illness and disease. A healthy diet, regular physical exercise & weight monitoring are important for maintaining a healthy lifestyle. Congestive Heart Failure  You may be retaining fluid if you have a history of heart failure or if you experience any of the following symptoms:  Weight gain of 3 pounds or more overnight or 5 pounds in a week, increased swelling in our hands or feet or shortness of breath while lying flat in bed. Please call your doctor as soon as you notice any of these symptoms; do not wait until your next office visit. Recognize signs and symptoms of STROKE:  F - face looks uneven  A - arms unable to move or move even  S - speech slurred or non-existent  T - time-call 911 as soon as signs and symptoms begin-DO NOT go         Back to bed or wait to see if you get better-TIME IS BRAIN. Warning signs of HEART ATTACK  Call 911 if you have these symptoms    · Chest discomfort. Most heart attacks involve discomfort in the center of the chest that lasts more than a few minutes, or that goes away and comes back. It can feel like uncomfortable pressure, squeezing, fullness, or pain. · Discomfort in other areas of the upper body. Symptoms can include pain or discomfort in one or both        Arms, the back, neck, jaw, or stomach. ·  Shortness of breath with or without chest discomfort. · Other signs may include breaking out in a cold sweat, nausea, or lightheadedness    Don't wait more than five minutes to call 911 - MINUTES MATTER! Fast action can save your life. Calling 911 is almost always the fastest way to get lifesaving treatment. Emergency Medical Services staff can begin treatment when they arrive - up to an hour sooner than if someone gets to the hospital by car.

## 2020-01-30 NOTE — ANESTHESIA PREPROCEDURE EVALUATION
Relevant Problems   No relevant active problems       Anesthetic History   No history of anesthetic complications            Review of Systems / Medical History  Patient summary reviewed, nursing notes reviewed and pertinent labs reviewed    Pulmonary  Within defined limits                 Neuro/Psych   Within defined limits           Cardiovascular    Hypertension                   GI/Hepatic/Renal  Within defined limits              Endo/Other  Within defined limits           Other Findings   Comments: Neck mass           Physical Exam    Airway  Mallampati: II  TM Distance: 4 - 6 cm  Neck ROM: normal range of motion        Cardiovascular    Rhythm: regular  Rate: normal         Dental    Dentition: Poor dentition  Comments:  Many loose, chipped, some missing   Pulmonary  Breath sounds clear to auscultation               Abdominal         Other Findings            Anesthetic Plan    ASA: 2  Anesthesia type: general          Induction: Intravenous  Anesthetic plan and risks discussed with: Patient

## 2020-01-30 NOTE — ANESTHESIA POSTPROCEDURE EVALUATION
Procedure(s):  EXCISION OF RIGHT NECK MASS. general    Anesthesia Post Evaluation      Multimodal analgesia: multimodal analgesia used between 6 hours prior to anesthesia start to PACU discharge  Patient location during evaluation: PACU  Patient participation: complete - patient participated  Level of consciousness: awake and alert  Pain management: adequate  Airway patency: patent  Anesthetic complications: no  Cardiovascular status: acceptable  Respiratory status: acceptable  Hydration status: acceptable  Post anesthesia nausea and vomiting:  none      Vitals Value Taken Time   /74 1/30/2020 10:50 AM   Temp 37.3 °C (99.1 °F) 1/30/2020  9:55 AM   Pulse 94 1/30/2020 10:54 AM   Resp 16 1/30/2020 10:54 AM   SpO2 96 % 1/30/2020 10:54 AM   Vitals shown include unvalidated device data.

## 2020-01-30 NOTE — H&P
H&P    69year old woman with cystic neck mass lateral to right thyroid lobe presents today for excision. The mass is painless. She is not aware of any dysphagia or voice change. CT showed 6.5 cm mass. PE:  General - NAD, thin. Neck - soft mass lateral to right thyroid lobe, non-tender. No adenopathy. CV - lungs clear. RRR. 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 Procedure reviewed, risks discussed, will proceed with surgical excision. Right neck marked, consent obtained.

## 2020-01-31 NOTE — OP NOTES
[facility]  OPERATIVE REPORT    Name:  Kevin Gore  MR#:  [id number]  :  1945  ACCOUNT #:  [de-identified]  DATE OF SERVICE:  2020    PREOPERATIVE DIAGNOSIS:  Right lateral cystic neck mass. POSTOPERATIVE DIAGNOSIS:  Right lateral cystic neck mass. PROCEDURE PERFORMED:  Excision of right lateral cystic neck mass. SURGEON:  Nanci Liz MD    ASSISTANT:  Gregoria Trujillo. ANESTHESIA:  General endotracheal.    COMPLICATIONS:  None. SPECIMENS REMOVED:  Right lateral neck mass. DRAINS:  None. IMPLANTS:  None. ESTIMATED BLOOD LOSS:  Less than 5 mL. INDICATIONS FOR SURGERY:  The patient is a 19-year-old female who has been aware of right neck mass for over several months. She was hospitalized six months ago for evaluation of the neck mass. A soft tissue neck CT in 2019 demonstrated a 6.2 x 7.0 x 9.1 cm well-circumscribed mass in the right lateral neck. She has mild compressive symptoms. Following discussion regarding the management options, decision was made to proceed with surgical excision. OPERATIVE FINDINGS:  There was a large mass involving the right lateral neck upbiting the lateral aspect at the left jugular vein. The mass was fairly intimate with the left internal jugular vein but was able to be dissected away from the vein without entering or division of the internal jugular vein. The mass extended from the inferior level IV region at the entrance, just inferior to the clavicle and superiorly to the level of the superior aspect of the thyroid cartilage. .  The mass was excised in its entirety. There was no obvious lymphatic leak. There were no other masses or adenopathy present in the right lateral aspect of the neck. There were no known intraoperative complications. Surgical pathology is pending.     DESCRIPTION OF PROCEDURE:  The patient was met in the preoperative area where the procedure was discussed in detail, the right neck was marked, and a surgical consent was obtained. She was then taken the operating room where she was placed in a supine position on the operating table. General anesthesia was commenced and she was orotracheally intubated without difficulty. A shoulder roll was placed. The neck was extended and the head was turned slightly to the left side to expose the right neck. The future incision site was marked and subcutaneously infiltrated with 0.5% Marcaine with epinephrine. The region was then prepped and draped in a sterile fashion. A surgical time-out was then performed. A transverse incision was made in the right lateral neck overlying the palpable mass. The incision was carried in a transverse resting skin tension line but then it was carried slightly superiorly along the posterior edge of the sternocleidomastoid muscle. Dissection was carried through the immediate subcutaneous tissues. The platysma muscle was carefully divided. Hemostasis was easily achieved with some monopolar and bipolar cautery. Subplatysmal flaps were elevated both inferiorly and superiorly. The anterior border of the sternocleidomastoid muscle was identified and the muscle was able to be reflected posteriorly to expose the mass. The mass was carefully dissected free from its surrounding attachments. Meticulous dissection was carried inferiorly to avoid injury to any supraclavicular nerves or any large vascular structures. Laterally, the mass was carefully dissected free from its deeper attachments using mainly blunt dissection as well as bipolar cautery. Superiorly, the mass was also divided free from its attachments without complication. Medially, the mass was carefully dissected free from the internal jugular vein. It was in very close proximity to the vein but the fascia was able to be  and the vein was able to fall away without complication.   At this point, the mass was entirely removed. It was handed off of the table and sent to Pathology as a permanent specimen. The right neck was carefully inspected for any bleeding. Hemostasis appeared to be excellent. The wound was irrigated with saline. Alex was topically applied to the surgical bed. The wound was closed in layers with the deep layers being approximated using 3-0 chromic and the subcuticular layer being approximated using 4-0 Monocryl in a running subcuticular fashion. 0.5% Marcaine with epinephrine was applied around the incision. Dermabond was applied to the skin. The drapes were removed. The patient was awakened, extubated without event. She was safely transferred to postanesthesia care unit  There were no intraoperative complications. Surgical pathology is pending.         Prosper Faust MD      [id number]  D:  01/30/2020 10:23  T:  01/30/2020 20:30  JOB #:  [id number]

## (undated) DEVICE — DERMABOND SKIN ADH 0.7ML -- DERMABOND ADVANCED 12/BX

## (undated) DEVICE — SOLIDIFIER MEDC 1200ML -- CONVERT TO 356117

## (undated) DEVICE — SUTURE PERMAHAND SZ 2-0 L30IN NONABSORBABLE BLK SILK W/O A305H

## (undated) DEVICE — BITEBLOCK ENDOSCP 60FR MAXI WHT POLYETH STURDY W/ VELC WVN

## (undated) DEVICE — NDL PRT INJ NSAF BLNT 18GX1.5 --

## (undated) DEVICE — INFECTION CONTROL KIT SYS

## (undated) DEVICE — BIPOLAR FORCEPS CORD: Brand: VALLEYLAB

## (undated) DEVICE — SET GRAV CK VLV NEEDLESS ST 3 GANGED 4WAY STPCOCK HI FLO 10

## (undated) DEVICE — SUTURE MCRYL SZ 4-0 L27IN ABSRB UD L19MM PS-2 1/2 CIR PRIM Y426H

## (undated) DEVICE — BAG BELONG PT PERS CLEAR HANDL

## (undated) DEVICE — Device

## (undated) DEVICE — STERILE POLYISOPRENE POWDER-FREE SURGICAL GLOVES: Brand: PROTEXIS

## (undated) DEVICE — PREP CHLORAPREP 10.5 ML ORG --

## (undated) DEVICE — SYRINGE MED 10CC ECC TIP W/O NDL

## (undated) DEVICE — BAG SPEC BIOHZRD 10 X 10 IN --

## (undated) DEVICE — 3M™ CUROS™ DISINFECTING CAP FOR NEEDLELESS CONNECTORS 270/CARTON 20 CARTONS/CASE CFF1-270: Brand: CUROS™

## (undated) DEVICE — AIRLIFE™ CORRUGATED FLEXIBLE EVA TUBING FOR AEROSOL AND IPPB USE, SEGMENTED, 6 FEET (1.8 M) LENGTH, 22 MM I.D.: Brand: AIRLIFE™

## (undated) DEVICE — KENDALL RADIOLUCENT FOAM MONITORING ELECTRODE -RECTANGULAR SHAPE: Brand: KENDALL

## (undated) DEVICE — CONTAINER SPEC 20 ML LID NEUT BUFF FORMALIN 10 % POLYPR STS

## (undated) DEVICE — SUTURE CHROMIC GUT SZ 3-0 L27IN ABSRB BRN L19MM PS-2 3/8 1638H

## (undated) DEVICE — ADULT SPO2 SENSOR: Brand: NELLCOR

## (undated) DEVICE — SOL IRRIGATION INJ NACL 0.9% 500ML BTL

## (undated) DEVICE — AGENT HEMSTAT 3GM PURIFIED PLNT STARCH PWD ABSRB ARISTA AH

## (undated) DEVICE — 1200 GUARD II KIT W/5MM TUBE W/O VAC TUBE: Brand: GUARDIAN

## (undated) DEVICE — SPONGE: SPECIALTY PEANUT XR 100/CS: Brand: MEDICAL ACTION INDUSTRIES

## (undated) DEVICE — MICRODISSECTION NEEDLE STRAIGHT SLEEVE: Brand: COLORADO

## (undated) DEVICE — BRUSH CYTO BRONCHSCP 1.5/140MM -- CELLEBRITY

## (undated) DEVICE — CANISTER, RIGID, 3000CC: Brand: MEDLINE INDUSTRIES, INC.

## (undated) DEVICE — ROCKER SWITCH PENCIL BLADE ELECTRODE, HOLSTER: Brand: EDGE

## (undated) DEVICE — GOWN,SIRUS,POLYRNF,BRTHSLV,XL,30/CS: Brand: MEDLINE

## (undated) DEVICE — TRAP SUC MUCOUS 70ML -- MEDICHOICE MEDLINE

## (undated) DEVICE — GAUZE,SPONGE,4"X4",16PLY,XRAY,STRL,LF: Brand: MEDLINE

## (undated) DEVICE — FCPS BIOP PULM RAD JAW 100CML -- BX/10 M00515180

## (undated) DEVICE — PACK PROCEDURE SURG ENT CUST

## (undated) DEVICE — KIT COLON W/ 1.1OZ LUB AND 2 END

## (undated) DEVICE — BASIN EMSIS 16OZ GRAPHITE PLAS KID SHP MOLD GRAD FOR ORAL

## (undated) DEVICE — AIRLIFE™ ADULT OXYGEN MASK VINYL, UNDER-THE-CHIN STYLE, 3 IN 1 MASK WITH 7 FEET (2.1 M) CRUSH-RESISTANT TUBING AND U/CONNECT-IT ADAPTER: Brand: AIRLIFE™

## (undated) DEVICE — 3M™ TEGADERM™ TRANSPARENT FILM DRESSING FRAME STYLE, 1626W, 4 IN X 4-3/4 IN (10 CM X 12 CM), 50/CT 4CT/CASE: Brand: 3M™ TEGADERM™

## (undated) DEVICE — SET ADMIN 16ML TBNG L100IN 2 Y INJ SITE IV PIGGY BK DISP

## (undated) DEVICE — STRAP,POSITIONING,KNEE/BODY,FOAM,4X60": Brand: MEDLINE